# Patient Record
Sex: MALE | Race: WHITE | NOT HISPANIC OR LATINO | Employment: STUDENT | URBAN - METROPOLITAN AREA
[De-identification: names, ages, dates, MRNs, and addresses within clinical notes are randomized per-mention and may not be internally consistent; named-entity substitution may affect disease eponyms.]

---
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
## 2023-01-17 ENCOUNTER — PATIENT MESSAGE (OUTPATIENT)
Dept: PEDIATRICS | Facility: CLINIC | Age: 18
End: 2023-01-17
Payer: COMMERCIAL

## 2023-02-06 ENCOUNTER — PATIENT MESSAGE (OUTPATIENT)
Dept: ADMINISTRATIVE | Facility: HOSPITAL | Age: 18
End: 2023-02-06
Payer: COMMERCIAL

## 2023-05-01 ENCOUNTER — OFFICE VISIT (OUTPATIENT)
Dept: PEDIATRICS | Facility: CLINIC | Age: 18
End: 2023-05-01
Payer: COMMERCIAL

## 2023-05-01 VITALS
HEIGHT: 73 IN | DIASTOLIC BLOOD PRESSURE: 81 MMHG | SYSTOLIC BLOOD PRESSURE: 142 MMHG | BODY MASS INDEX: 29.49 KG/M2 | TEMPERATURE: 99 F | HEART RATE: 98 BPM | WEIGHT: 222.5 LBS

## 2023-05-01 DIAGNOSIS — F90.2 ADHD (ATTENTION DEFICIT HYPERACTIVITY DISORDER), COMBINED TYPE: Primary | ICD-10-CM

## 2023-05-01 DIAGNOSIS — Z00.00 ENCOUNTER FOR WELL ADULT EXAM WITHOUT ABNORMAL FINDINGS: ICD-10-CM

## 2023-05-01 PROCEDURE — 1160F RVW MEDS BY RX/DR IN RCRD: CPT | Mod: CPTII,S$GLB,, | Performed by: PEDIATRICS

## 2023-05-01 PROCEDURE — 1160F PR REVIEW ALL MEDS BY PRESCRIBER/CLIN PHARMACIST DOCUMENTED: ICD-10-PCS | Mod: CPTII,S$GLB,, | Performed by: PEDIATRICS

## 2023-05-01 PROCEDURE — 3077F SYST BP >= 140 MM HG: CPT | Mod: CPTII,S$GLB,, | Performed by: PEDIATRICS

## 2023-05-01 PROCEDURE — 99213 PR OFFICE/OUTPT VISIT, EST, LEVL III, 20-29 MIN: ICD-10-PCS | Mod: 25,S$GLB,, | Performed by: PEDIATRICS

## 2023-05-01 PROCEDURE — 99395 PR PREVENTIVE VISIT,EST,18-39: ICD-10-PCS | Mod: 25,S$GLB,, | Performed by: PEDIATRICS

## 2023-05-01 PROCEDURE — 99999 PR PBB SHADOW E&M-EST. PATIENT-LVL III: CPT | Mod: PBBFAC,,, | Performed by: PEDIATRICS

## 2023-05-01 PROCEDURE — 3079F PR MOST RECENT DIASTOLIC BLOOD PRESSURE 80-89 MM HG: ICD-10-PCS | Mod: CPTII,S$GLB,, | Performed by: PEDIATRICS

## 2023-05-01 PROCEDURE — 99999 PR PBB SHADOW E&M-EST. PATIENT-LVL III: ICD-10-PCS | Mod: PBBFAC,,, | Performed by: PEDIATRICS

## 2023-05-01 PROCEDURE — 3008F BODY MASS INDEX DOCD: CPT | Mod: CPTII,S$GLB,, | Performed by: PEDIATRICS

## 2023-05-01 PROCEDURE — 1159F MED LIST DOCD IN RCRD: CPT | Mod: CPTII,S$GLB,, | Performed by: PEDIATRICS

## 2023-05-01 PROCEDURE — 99395 PREV VISIT EST AGE 18-39: CPT | Mod: 25,S$GLB,, | Performed by: PEDIATRICS

## 2023-05-01 PROCEDURE — 3077F PR MOST RECENT SYSTOLIC BLOOD PRESSURE >= 140 MM HG: ICD-10-PCS | Mod: CPTII,S$GLB,, | Performed by: PEDIATRICS

## 2023-05-01 PROCEDURE — 1159F PR MEDICATION LIST DOCUMENTED IN MEDICAL RECORD: ICD-10-PCS | Mod: CPTII,S$GLB,, | Performed by: PEDIATRICS

## 2023-05-01 PROCEDURE — 3008F PR BODY MASS INDEX (BMI) DOCUMENTED: ICD-10-PCS | Mod: CPTII,S$GLB,, | Performed by: PEDIATRICS

## 2023-05-01 PROCEDURE — 99213 OFFICE O/P EST LOW 20 MIN: CPT | Mod: 25,S$GLB,, | Performed by: PEDIATRICS

## 2023-05-01 PROCEDURE — 3079F DIAST BP 80-89 MM HG: CPT | Mod: CPTII,S$GLB,, | Performed by: PEDIATRICS

## 2023-05-01 RX ORDER — DEXTROAMPHETAMINE SACCHARATE, AMPHETAMINE ASPARTATE MONOHYDRATE, DEXTROAMPHETAMINE SULFATE AND AMPHETAMINE SULFATE 5; 5; 5; 5 MG/1; MG/1; MG/1; MG/1
CAPSULE, EXTENDED RELEASE ORAL
Qty: 60 CAPSULE | Refills: 0 | Status: SHIPPED | OUTPATIENT
Start: 2023-06-01 | End: 2023-06-27 | Stop reason: SDUPTHER

## 2023-05-01 RX ORDER — DEXTROAMPHETAMINE SACCHARATE, AMPHETAMINE ASPARTATE MONOHYDRATE, DEXTROAMPHETAMINE SULFATE AND AMPHETAMINE SULFATE 5; 5; 5; 5 MG/1; MG/1; MG/1; MG/1
CAPSULE, EXTENDED RELEASE ORAL
Qty: 60 CAPSULE | Refills: 0 | Status: SHIPPED | OUTPATIENT
Start: 2023-05-01 | End: 2023-05-22 | Stop reason: SDUPTHER

## 2023-05-01 RX ORDER — DEXTROAMPHETAMINE SACCHARATE, AMPHETAMINE ASPARTATE MONOHYDRATE, DEXTROAMPHETAMINE SULFATE AND AMPHETAMINE SULFATE 2.5; 2.5; 2.5; 2.5 MG/1; MG/1; MG/1; MG/1
10 CAPSULE, EXTENDED RELEASE ORAL DAILY
Qty: 30 CAPSULE | Refills: 0 | Status: CANCELLED | OUTPATIENT
Start: 2023-05-01 | End: 2023-05-31

## 2023-05-01 RX ORDER — DEXTROAMPHETAMINE SACCHARATE, AMPHETAMINE ASPARTATE MONOHYDRATE, DEXTROAMPHETAMINE SULFATE AND AMPHETAMINE SULFATE 2.5; 2.5; 2.5; 2.5 MG/1; MG/1; MG/1; MG/1
10 CAPSULE, EXTENDED RELEASE ORAL DAILY
Qty: 30 CAPSULE | Refills: 0 | Status: CANCELLED | OUTPATIENT
Start: 2023-05-31 | End: 2023-06-30

## 2023-05-01 RX ORDER — DEXTROAMPHETAMINE SACCHARATE, AMPHETAMINE ASPARTATE MONOHYDRATE, DEXTROAMPHETAMINE SULFATE AND AMPHETAMINE SULFATE 7.5; 7.5; 7.5; 7.5 MG/1; MG/1; MG/1; MG/1
30 CAPSULE, EXTENDED RELEASE ORAL EVERY MORNING
Qty: 30 CAPSULE | Refills: 0 | Status: CANCELLED | OUTPATIENT
Start: 2023-05-31 | End: 2023-06-30

## 2023-05-01 RX ORDER — DEXTROAMPHETAMINE SACCHARATE, AMPHETAMINE ASPARTATE MONOHYDRATE, DEXTROAMPHETAMINE SULFATE AND AMPHETAMINE SULFATE 7.5; 7.5; 7.5; 7.5 MG/1; MG/1; MG/1; MG/1
30 CAPSULE, EXTENDED RELEASE ORAL EVERY MORNING
Qty: 30 CAPSULE | Refills: 0 | Status: CANCELLED | OUTPATIENT
Start: 2023-05-01 | End: 2023-05-31

## 2023-05-01 RX ORDER — DEXTROAMPHETAMINE SACCHARATE, AMPHETAMINE ASPARTATE MONOHYDRATE, DEXTROAMPHETAMINE SULFATE AND AMPHETAMINE SULFATE 7.5; 7.5; 7.5; 7.5 MG/1; MG/1; MG/1; MG/1
30 CAPSULE, EXTENDED RELEASE ORAL EVERY MORNING
Qty: 30 CAPSULE | Refills: 0 | Status: CANCELLED | OUTPATIENT
Start: 2023-06-30 | End: 2023-07-30

## 2023-05-01 RX ORDER — DEXTROAMPHETAMINE SACCHARATE, AMPHETAMINE ASPARTATE MONOHYDRATE, DEXTROAMPHETAMINE SULFATE AND AMPHETAMINE SULFATE 2.5; 2.5; 2.5; 2.5 MG/1; MG/1; MG/1; MG/1
10 CAPSULE, EXTENDED RELEASE ORAL DAILY
Qty: 30 CAPSULE | Refills: 0 | Status: CANCELLED | OUTPATIENT
Start: 2023-06-30 | End: 2023-07-30

## 2023-05-01 RX ORDER — DEXTROAMPHETAMINE SACCHARATE, AMPHETAMINE ASPARTATE MONOHYDRATE, DEXTROAMPHETAMINE SULFATE AND AMPHETAMINE SULFATE 5; 5; 5; 5 MG/1; MG/1; MG/1; MG/1
CAPSULE, EXTENDED RELEASE ORAL
Qty: 60 CAPSULE | Refills: 0 | Status: SHIPPED | OUTPATIENT
Start: 2023-07-01 | End: 2023-06-27 | Stop reason: RX

## 2023-05-01 NOTE — PATIENT INSTRUCTIONS
Dr. Santiago, [facility] [place]  Pediatric and Adolescent Medicine  [phone]        [facility]    Nutrition:  - Limit snacks, fast food, dessert, junk food.  Eat regular meals    Teeth:  FLOSS, brush minimum twice a day  Fluoride mouth wash, i. e. Act  - Dentist regularly  - You do want teeth after fifty years old?    Toxics:  - Alcohol, drugs, tobacco  Lots of availability, temptation    Driving:  over 5,000 college age die and >500,000 are injured each year from MVAs  Look three cars ahead  Do NOT text and drive  Wear your seat belt  Use your mirrors with constant surveillance      Dating:  - [place] are sexually driven, girls have a need to be loved  - Do you want to be a parent right now?  - Abstain  - Learn from all your relationships  - If break-up, speak nicely of him/her  - You will find the right partner at the right time.    Academics:    - [employer]  Prepare- read through material before class  Attend class  Review notes same day as class  Back-it-up:  Before test study at least three days before test  Professor- pick, if possible, after reviews by friends or <"[employer]> or <OM Latam>  Meet your professor outside of class, so they know your face    -Extra help- take advantage of study skills classes, academic centers or tutors.  Get ahead of any potential problems.  - Ideas- study partners or groups, but must study (not just social)      Exercise:  Don't be a couch potato  Work toward an hour each day of aerobic exercise (min 30 minutes)  Play tennis, golf, swim, walk/run, etc    Stay in touch:  - Contact your parents regularly during your schooling and beyond- mom & dad  - text is really easy    Our office:  - We are honored to continue being your doctor at least until you finish schooling.  - Once 18 years old, you have doctor/patient confidentiality.  Your parents cannot receive information from us unless you allow.    Immunizations:  - MCV4 (Meningococcal), need two,  one after 16 years old  - TdaP (Tetanus)- within last 10 years  - HPV- helps cervical cancer in girls; urogenital cancer in boys and girls

## 2023-05-01 NOTE — PROGRESS NOTES
Subjective  Max Gray is a 17 y.o. male who is here for a checkup alone, who is a historian.      Subjective:     HISTORY:    Interval History / Parental Concerns: getting ADHD medication    School:[employer]   Grade: Freshman majoring in General Education   Progress/Grades:  2.7 gpa    Working at [employer]- parttime- 30-40 hours.    Concerns:  none      Subjective:   HPI:    his  ADHD symptoms have worsened because of shortage of medication since last visit.    Attentive with homework: Yes    Side effects of Medicine:  Sleep, appetite or other? none    Current ADHD Medication/Dose: Adderall XR 30 and Adderall XR 10 mg  Concerns? Pt wants to switch medication Adderall XR 20 BID- unable to find Adderall XR 30 mg.    Depression Patient Health Questionnaire 5/1/2023   Over the last two weeks how often have you been bothered by little interest or pleasure in doing things Nearly every day   Over the last two weeks how often have you been bothered by feeling down, depressed or hopeless Not at all   PHQ-2 Total Score 3   Over the last two weeks how often have you been bothered by trouble falling or staying asleep, or sleeping too much Not at all   Over the last two weeks how often have you been bothered by feeling tired or having little energy Several days   Over the last two weeks how often have you been bothered by a poor appetite or overeating Not at all   Over the last two weeks how often have you been bothered by feeling bad about yourself - or that you are a failure or have let yourself or your family down Not at all   Over the last two weeks how often have you been bothered by trouble concentrating on things, such as reading the newspaper or watching television Nearly every day   Over the last two weeks how often have you been bothered by moving or speaking so slowly that other people could have noticed. Or the opposite - being so fidgety or restless that you have been moving around a lot more than usual.  "Several days   Over the last two weeks how often have you been bothered by thoughts that you would be better off dead, or of hurting yourself Not at all   If you checked off any problems, how difficult have these problems made it for you to do your work, take care of things at home or get along with other people? Somewhat difficult   Total Score 8   Interpretation Mild             Review of patient's allergies indicates:  No Known Allergies    Patient Active Problem List   Diagnosis    ADHD (attention deficit hyperactivity disorder), combined type           Objective:      PHYSICAL EXAM  Vitals:    05/01/23 1633   BP: (!) 142/81   BP Location: Right arm   Patient Position: Sitting   BP Method: Medium (Automatic)   Pulse: 98   Temp: 98.7 °F (37.1 °C)   TempSrc: Oral   Weight: 100.9 kg (222 lb 8 oz)   Height: 6' 0.5" (1.842 m)         Height Percentile for Age  85 %ile (Z= 1.04) based on [facility] (Boys, 2-20 Years) Stature-for-age data based on Stature recorded on 5/1/2023.    Weight Percentile for Age  97 %ile (Z= 1.93) based on CDC (Boys, 2-20 Years) weight-for-age data using vitals from 5/1/2023.    Body Mass Index  Body mass index is 29.76 kg/m².  94 %ile (Z= 1.59) based on CDC (Boys, 2-20 Years) BMI-for-age based on BMI available as of 5/1/2023.      Physical Exam  Vitals reviewed.   Constitutional:       Appearance: Normal appearance.   HENT:      Right Ear: Tympanic membrane normal.      Left Ear: Tympanic membrane normal.      Nose: Nose normal.      Mouth/Throat:      Pharynx: Oropharynx is clear.   Eyes:      Conjunctiva/sclera: Conjunctivae normal.   Cardiovascular:      Rate and Rhythm: Normal rate and regular rhythm.      Heart sounds: Normal heart sounds. No murmur heard.    No friction rub. No gallop.   Pulmonary:      Breath sounds: Normal breath sounds.   Abdominal:      Palpations: Abdomen is soft.      Tenderness: There is no abdominal tenderness.   Musculoskeletal:         General: Normal range of " motion.      Cervical back: Neck supple.   Skin:     Findings: No rash.   Neurological:      General: No focal deficit present.         Assessment/Plan:      ADHD (attention deficit hyperactivity disorder), combined type  -     dextroamphetamine-amphetamine (ADDERALL XR) 20 MG 24 hr capsule; Take 2 capsules by mouth every morning for 30 days  Dispense: 60 capsule; Refill: 0  -     dextroamphetamine-amphetamine (ADDERALL XR) 20 MG 24 hr capsule; Take two capsules by mouth daily for 30 days  Dispense: 60 capsule; Refill: 0  -     dextroamphetamine-amphetamine (ADDERALL XR) 20 MG 24 hr capsule; Take two capsules by mouth daily for 30 days  Dispense: 60 capsule; Refill: 0    Encounter for well adult exam without abnormal findings      Healthy     PLAN:  1.  Discussed anticipatory guidance (including nutrition, education, safety, dental care, discipline, family, exercise, physical acticvity) and given age appropriate hand out.   Pediatric Physical Activity and Nutritional Counseling  2.  Immunizations received.  May give Acetaminophen (Tylenol).  3.  Discussed after hours care and advice - call [phone] (our office).  4.  Follow-up at next well child visit (Regular Supervision Visit) in one year or sooner prn.      Instead of Adderall XR 30+10, will try same dose- Adderall XR 20 mg two daily

## 2023-05-05 ENCOUNTER — PATIENT MESSAGE (OUTPATIENT)
Dept: PEDIATRICS | Facility: CLINIC | Age: 18
End: 2023-05-05
Payer: COMMERCIAL

## 2023-05-22 DIAGNOSIS — F90.2 ADHD (ATTENTION DEFICIT HYPERACTIVITY DISORDER), COMBINED TYPE: ICD-10-CM

## 2023-05-22 RX ORDER — DEXTROAMPHETAMINE SACCHARATE, AMPHETAMINE ASPARTATE MONOHYDRATE, DEXTROAMPHETAMINE SULFATE AND AMPHETAMINE SULFATE 5; 5; 5; 5 MG/1; MG/1; MG/1; MG/1
CAPSULE, EXTENDED RELEASE ORAL
Qty: 60 CAPSULE | Refills: 0 | Status: SHIPPED | OUTPATIENT
Start: 2023-05-22 | End: 2023-06-26

## 2023-05-22 NOTE — TELEPHONE ENCOUNTER
----- Message from Birdie Atkins MA sent at 5/22/2023 11:55 AM CDT -----  Regarding: transfer prescription  Max wants his 20mg adderall prescription moved to the [facility] on [address].

## 2023-06-22 ENCOUNTER — PATIENT MESSAGE (OUTPATIENT)
Dept: PEDIATRICS | Facility: CLINIC | Age: 18
End: 2023-06-22
Payer: COMMERCIAL

## 2023-06-24 DIAGNOSIS — F90.2 ADHD (ATTENTION DEFICIT HYPERACTIVITY DISORDER), COMBINED TYPE: ICD-10-CM

## 2023-06-26 RX ORDER — DEXTROAMPHETAMINE SACCHARATE, AMPHETAMINE ASPARTATE MONOHYDRATE, DEXTROAMPHETAMINE SULFATE AND AMPHETAMINE SULFATE 5; 5; 5; 5 MG/1; MG/1; MG/1; MG/1
CAPSULE, EXTENDED RELEASE ORAL
Qty: 60 CAPSULE | Refills: 0 | Status: SHIPPED | OUTPATIENT
Start: 2023-06-26 | End: 2023-06-27 | Stop reason: RX

## 2023-06-27 DIAGNOSIS — F90.2 ADHD (ATTENTION DEFICIT HYPERACTIVITY DISORDER), COMBINED TYPE: ICD-10-CM

## 2023-06-27 RX ORDER — DEXTROAMPHETAMINE SACCHARATE, AMPHETAMINE ASPARTATE MONOHYDRATE, DEXTROAMPHETAMINE SULFATE AND AMPHETAMINE SULFATE 5; 5; 5; 5 MG/1; MG/1; MG/1; MG/1
CAPSULE, EXTENDED RELEASE ORAL
Qty: 60 CAPSULE | Refills: 0 | Status: SHIPPED | OUTPATIENT
Start: 2023-06-27 | End: 2023-07-26

## 2023-06-27 NOTE — TELEPHONE ENCOUNTER
"Spoke with pharmacist, they never received rx. Resending Adderall-XR 20 mg daily #30 to verified pharmacy. ---- Message from Shea Aldridge MA sent at 6/27/2023  2:50 PM CDT -----  Contact: Patient  Patient attempted to fill Adderall Rx but the pharmacy informed him that they were unable to fill the medication because it was "locked."     Call back - [phone]    "

## 2023-08-23 ENCOUNTER — OFFICE VISIT (OUTPATIENT)
Dept: PEDIATRICS | Facility: CLINIC | Age: 18
End: 2023-08-23
Payer: COMMERCIAL

## 2023-08-23 VITALS
TEMPERATURE: 99 F | BODY MASS INDEX: 27.83 KG/M2 | SYSTOLIC BLOOD PRESSURE: 129 MMHG | DIASTOLIC BLOOD PRESSURE: 65 MMHG | HEIGHT: 73 IN | WEIGHT: 210 LBS | HEART RATE: 81 BPM

## 2023-08-23 DIAGNOSIS — F90.2 ADHD (ATTENTION DEFICIT HYPERACTIVITY DISORDER), COMBINED TYPE: Primary | ICD-10-CM

## 2023-08-23 PROCEDURE — 99999 PR PBB SHADOW E&M-EST. PATIENT-LVL III: CPT | Mod: PBBFAC,,, | Performed by: PEDIATRICS

## 2023-08-23 PROCEDURE — 1159F PR MEDICATION LIST DOCUMENTED IN MEDICAL RECORD: ICD-10-PCS | Mod: CPTII,S$GLB,, | Performed by: PEDIATRICS

## 2023-08-23 PROCEDURE — 3008F PR BODY MASS INDEX (BMI) DOCUMENTED: ICD-10-PCS | Mod: CPTII,S$GLB,, | Performed by: PEDIATRICS

## 2023-08-23 PROCEDURE — 3008F BODY MASS INDEX DOCD: CPT | Mod: CPTII,S$GLB,, | Performed by: PEDIATRICS

## 2023-08-23 PROCEDURE — 3078F PR MOST RECENT DIASTOLIC BLOOD PRESSURE < 80 MM HG: ICD-10-PCS | Mod: CPTII,S$GLB,, | Performed by: PEDIATRICS

## 2023-08-23 PROCEDURE — 3078F DIAST BP <80 MM HG: CPT | Mod: CPTII,S$GLB,, | Performed by: PEDIATRICS

## 2023-08-23 PROCEDURE — 3074F PR MOST RECENT SYSTOLIC BLOOD PRESSURE < 130 MM HG: ICD-10-PCS | Mod: CPTII,S$GLB,, | Performed by: PEDIATRICS

## 2023-08-23 PROCEDURE — 99214 OFFICE O/P EST MOD 30 MIN: CPT | Mod: S$GLB,,, | Performed by: PEDIATRICS

## 2023-08-23 PROCEDURE — 1159F MED LIST DOCD IN RCRD: CPT | Mod: CPTII,S$GLB,, | Performed by: PEDIATRICS

## 2023-08-23 PROCEDURE — 3074F SYST BP LT 130 MM HG: CPT | Mod: CPTII,S$GLB,, | Performed by: PEDIATRICS

## 2023-08-23 PROCEDURE — 99214 PR OFFICE/OUTPT VISIT, EST, LEVL IV, 30-39 MIN: ICD-10-PCS | Mod: S$GLB,,, | Performed by: PEDIATRICS

## 2023-08-23 PROCEDURE — 99999 PR PBB SHADOW E&M-EST. PATIENT-LVL III: ICD-10-PCS | Mod: PBBFAC,,, | Performed by: PEDIATRICS

## 2023-08-23 RX ORDER — DEXTROAMPHETAMINE SACCHARATE, AMPHETAMINE ASPARTATE MONOHYDRATE, DEXTROAMPHETAMINE SULFATE AND AMPHETAMINE SULFATE 5; 5; 5; 5 MG/1; MG/1; MG/1; MG/1
20 CAPSULE, EXTENDED RELEASE ORAL EVERY MORNING
Qty: 30 CAPSULE | Refills: 0 | Status: CANCELLED | OUTPATIENT
Start: 2023-10-22 | End: 2023-11-21

## 2023-08-23 RX ORDER — DEXTROAMPHETAMINE SACCHARATE, AMPHETAMINE ASPARTATE MONOHYDRATE, DEXTROAMPHETAMINE SULFATE AND AMPHETAMINE SULFATE 5; 5; 5; 5 MG/1; MG/1; MG/1; MG/1
40 CAPSULE, EXTENDED RELEASE ORAL EVERY MORNING
Qty: 60 CAPSULE | Refills: 0 | Status: SHIPPED | OUTPATIENT
Start: 2023-08-23 | End: 2023-10-24 | Stop reason: SDUPTHER

## 2023-08-23 RX ORDER — DEXTROAMPHETAMINE SACCHARATE, AMPHETAMINE ASPARTATE MONOHYDRATE, DEXTROAMPHETAMINE SULFATE AND AMPHETAMINE SULFATE 5; 5; 5; 5 MG/1; MG/1; MG/1; MG/1
40 CAPSULE, EXTENDED RELEASE ORAL EVERY MORNING
Qty: 60 CAPSULE | Refills: 0 | Status: SHIPPED | OUTPATIENT
Start: 2023-08-23 | End: 2023-09-22

## 2023-08-23 RX ORDER — DEXTROAMPHETAMINE SACCHARATE, AMPHETAMINE ASPARTATE MONOHYDRATE, DEXTROAMPHETAMINE SULFATE AND AMPHETAMINE SULFATE 5; 5; 5; 5 MG/1; MG/1; MG/1; MG/1
20 CAPSULE, EXTENDED RELEASE ORAL EVERY MORNING
Qty: 30 CAPSULE | Refills: 0 | Status: CANCELLED | OUTPATIENT
Start: 2023-08-23 | End: 2023-09-22

## 2023-08-23 RX ORDER — DEXTROAMPHETAMINE SACCHARATE, AMPHETAMINE ASPARTATE MONOHYDRATE, DEXTROAMPHETAMINE SULFATE AND AMPHETAMINE SULFATE 5; 5; 5; 5 MG/1; MG/1; MG/1; MG/1
20 CAPSULE, EXTENDED RELEASE ORAL EVERY MORNING
Qty: 30 CAPSULE | Refills: 0 | Status: CANCELLED | OUTPATIENT
Start: 2023-09-22 | End: 2023-10-22

## 2023-08-23 NOTE — PROGRESS NOTES
"    Subjective:   HPI:  Max Gray is a 17 y.o. patient here for follow up ADHD visit,  alone, who is a historian    his  ADHD symptoms have improved since last visit.    Attentive in afternoon (with homework): yes    Side effects of Medicine:    Trouble Sleeping; Appetite Changes; Jitteriness; Irritability or moodiness; Headaches or Stomach Aches; Other? Change in sleep cycle    Level/Grade in School:Working at [employer]   Performance: Going well    Accommodations? No    Current ADHD Medication/Dose in am: Adderall XR 20 mg BID   Afternoon medicine?   Dose-    Taking daily? Yes    Concerns? no        Daria allergies, medications, history, and problem list were updated as appropriate.    Review of patient's allergies indicates:  No Known Allergies    Patient Active Problem List   Diagnosis    ADHD (attention deficit hyperactivity disorder), combined type         Review of Systems  A comprehensive review of symptoms was completed and negative except as noted in the HPI.      Objective:     Vitals:    08/23/23 1110   BP: 129/65   BP Location: Left arm   Patient Position: Sitting   BP Method: Large (Automatic)   Pulse: 81   Temp: 98.5 °F (36.9 °C)   TempSrc: Oral   Weight: 95.3 kg (210 lb)   Height: 6' 0.75" (1.848 m)       Last 3 Weights:   Wt Readings from Last 3 Encounters:   08/23/23 1110 95.3 kg (210 lb) (95 %, Z= 1.64)*   05/01/23 1633 100.9 kg (222 lb 8 oz) (97 %, Z= 1.93)*   11/28/22 1528 90.9 kg (200 lb 6 oz) (93 %, Z= 1.48)*     * Growth percentiles are based on CDC (Boys, 2-20 Years) data.         Physical Exam  Vitals reviewed.   Constitutional:       Appearance: Normal appearance.   HENT:      Right Ear: Tympanic membrane normal.      Left Ear: Tympanic membrane normal.      Nose: Nose normal.      Mouth/Throat:      Pharynx: Oropharynx is clear.   Eyes:      Conjunctiva/sclera: Conjunctivae normal.   Cardiovascular:      Rate and Rhythm: Normal rate and regular rhythm.      Heart sounds: Normal " heart sounds. No murmur heard.     No friction rub. No gallop.   Pulmonary:      Breath sounds: Normal breath sounds.   Abdominal:      Palpations: Abdomen is soft.      Tenderness: There is no abdominal tenderness.   Musculoskeletal:         General: Normal range of motion.      Cervical back: Neck supple.   Skin:     Findings: No rash.   Neurological:      General: No focal deficit present.           Assessment/Plan:        ADHD (attention deficit hyperactivity disorder), combined type  -     dextroamphetamine-amphetamine (ADDERALL XR) 20 MG 24 hr capsule; Take 2 capsules (40 mg total) by mouth every morning.  Dispense: 60 capsule; Refill: 0  -     dextroamphetamine-amphetamine (ADDERALL XR) 20 MG 24 hr capsule; Take 2 capsules (40 mg total) by mouth every morning.  Dispense: 60 capsule; Refill: 0  -     dextroamphetamine-amphetamine (ADDERALL XR) 20 MG 24 hr capsule; Take 2 capsules (40 mg total) by mouth every morning.  Dispense: 60 capsule; Refill: 0            Outpatient Encounter Medications as of 8/23/2023   Medication Sig Dispense Refill    dextroamphetamine-amphetamine (ADDERALL XR) 20 MG 24 hr capsule Take 20 mg by mouth.      dextroamphetamine-amphetamine (ADDERALL XR) 10 MG 24 hr capsule Take 1 capsule (10 mg total) by mouth every morning. With Adderall XR 30mg 30 capsule 0    dextroamphetamine-amphetamine (ADDERALL XR) 20 MG 24 hr capsule Take 2 capsules (40 mg total) by mouth every morning. 60 capsule 0    dextroamphetamine-amphetamine (ADDERALL XR) 20 MG 24 hr capsule Take 2 capsules (40 mg total) by mouth every morning. 60 capsule 0    dextroamphetamine-amphetamine (ADDERALL XR) 20 MG 24 hr capsule Take 2 capsules (40 mg total) by mouth every morning. 60 capsule 0    dextroamphetamine-amphetamine (ADDERALL XR) 30 MG 24 hr capsule Take 1 capsule (30 mg total) by mouth every morning. With Adderall XR 10mg 30 capsule 0     No facility-administered encounter medications on file as of 8/23/2023.          Next scheduled follow-up appointment for ADD/ADHD management will be in 3 months.  If changes are made to medications, then will need to follow up in three to four weeks.    We discussed the management goals for patients with ADHD:  1. Adequate Control of Focus and/or Behavior.  2. Tolerable Medication Side-Effects (Including some Loss of Appetite).  Need to maintain weight.  3. Function well in life, school and/or work.

## 2023-10-24 DIAGNOSIS — F90.2 ADHD (ATTENTION DEFICIT HYPERACTIVITY DISORDER), COMBINED TYPE: ICD-10-CM

## 2023-10-24 RX ORDER — DEXTROAMPHETAMINE SACCHARATE, AMPHETAMINE ASPARTATE MONOHYDRATE, DEXTROAMPHETAMINE SULFATE AND AMPHETAMINE SULFATE 5; 5; 5; 5 MG/1; MG/1; MG/1; MG/1
40 CAPSULE, EXTENDED RELEASE ORAL EVERY MORNING
Qty: 60 CAPSULE | Refills: 0 | Status: SHIPPED | OUTPATIENT
Start: 2023-10-24 | End: 2023-12-23

## 2023-11-27 DIAGNOSIS — F90.2 ADHD (ATTENTION DEFICIT HYPERACTIVITY DISORDER), COMBINED TYPE: ICD-10-CM

## 2023-11-27 RX ORDER — DEXTROAMPHETAMINE SACCHARATE, AMPHETAMINE ASPARTATE MONOHYDRATE, DEXTROAMPHETAMINE SULFATE AND AMPHETAMINE SULFATE 5; 5; 5; 5 MG/1; MG/1; MG/1; MG/1
40 CAPSULE, EXTENDED RELEASE ORAL EVERY MORNING
Qty: 60 CAPSULE | Refills: 0 | OUTPATIENT
Start: 2023-11-27 | End: 2024-01-26

## 2023-12-05 ENCOUNTER — OFFICE VISIT (OUTPATIENT)
Dept: PEDIATRICS | Facility: CLINIC | Age: 18
End: 2023-12-05
Payer: COMMERCIAL

## 2023-12-05 VITALS
BODY MASS INDEX: 27.83 KG/M2 | HEART RATE: 82 BPM | SYSTOLIC BLOOD PRESSURE: 129 MMHG | WEIGHT: 210 LBS | HEIGHT: 73 IN | TEMPERATURE: 97 F | DIASTOLIC BLOOD PRESSURE: 84 MMHG

## 2023-12-05 DIAGNOSIS — F90.2 ADHD (ATTENTION DEFICIT HYPERACTIVITY DISORDER), COMBINED TYPE: Primary | ICD-10-CM

## 2023-12-05 PROCEDURE — 90686 IIV4 VACC NO PRSV 0.5 ML IM: CPT | Mod: S$GLB,,, | Performed by: PEDIATRICS

## 2023-12-05 PROCEDURE — 90686 FLU VACCINE (QUAD) GREATER THAN OR EQUAL TO 3YO PRESERVATIVE FREE IM: ICD-10-PCS | Mod: S$GLB,,, | Performed by: PEDIATRICS

## 2023-12-05 PROCEDURE — 90471 IMMUNIZATION ADMIN: CPT | Mod: S$GLB,,, | Performed by: PEDIATRICS

## 2023-12-05 PROCEDURE — 1160F RVW MEDS BY RX/DR IN RCRD: CPT | Mod: CPTII,S$GLB,, | Performed by: PEDIATRICS

## 2023-12-05 PROCEDURE — 1159F MED LIST DOCD IN RCRD: CPT | Mod: CPTII,S$GLB,, | Performed by: PEDIATRICS

## 2023-12-05 PROCEDURE — 1160F PR REVIEW ALL MEDS BY PRESCRIBER/CLIN PHARMACIST DOCUMENTED: ICD-10-PCS | Mod: CPTII,S$GLB,, | Performed by: PEDIATRICS

## 2023-12-05 PROCEDURE — 3008F BODY MASS INDEX DOCD: CPT | Mod: CPTII,S$GLB,, | Performed by: PEDIATRICS

## 2023-12-05 PROCEDURE — 3008F PR BODY MASS INDEX (BMI) DOCUMENTED: ICD-10-PCS | Mod: CPTII,S$GLB,, | Performed by: PEDIATRICS

## 2023-12-05 PROCEDURE — 99213 OFFICE O/P EST LOW 20 MIN: CPT | Mod: 25,S$GLB,, | Performed by: PEDIATRICS

## 2023-12-05 PROCEDURE — 3079F PR MOST RECENT DIASTOLIC BLOOD PRESSURE 80-89 MM HG: ICD-10-PCS | Mod: CPTII,S$GLB,, | Performed by: PEDIATRICS

## 2023-12-05 PROCEDURE — 3074F SYST BP LT 130 MM HG: CPT | Mod: CPTII,S$GLB,, | Performed by: PEDIATRICS

## 2023-12-05 PROCEDURE — 90471 FLU VACCINE (QUAD) GREATER THAN OR EQUAL TO 3YO PRESERVATIVE FREE IM: ICD-10-PCS | Mod: S$GLB,,, | Performed by: PEDIATRICS

## 2023-12-05 PROCEDURE — 99999 PR PBB SHADOW E&M-EST. PATIENT-LVL III: ICD-10-PCS | Mod: PBBFAC,,, | Performed by: PEDIATRICS

## 2023-12-05 PROCEDURE — 1159F PR MEDICATION LIST DOCUMENTED IN MEDICAL RECORD: ICD-10-PCS | Mod: CPTII,S$GLB,, | Performed by: PEDIATRICS

## 2023-12-05 PROCEDURE — 3079F DIAST BP 80-89 MM HG: CPT | Mod: CPTII,S$GLB,, | Performed by: PEDIATRICS

## 2023-12-05 PROCEDURE — 99213 PR OFFICE/OUTPT VISIT, EST, LEVL III, 20-29 MIN: ICD-10-PCS | Mod: 25,S$GLB,, | Performed by: PEDIATRICS

## 2023-12-05 PROCEDURE — 3074F PR MOST RECENT SYSTOLIC BLOOD PRESSURE < 130 MM HG: ICD-10-PCS | Mod: CPTII,S$GLB,, | Performed by: PEDIATRICS

## 2023-12-05 PROCEDURE — 99999 PR PBB SHADOW E&M-EST. PATIENT-LVL III: CPT | Mod: PBBFAC,,, | Performed by: PEDIATRICS

## 2023-12-05 RX ORDER — DEXTROAMPHETAMINE SACCHARATE, AMPHETAMINE ASPARTATE MONOHYDRATE, DEXTROAMPHETAMINE SULFATE AND AMPHETAMINE SULFATE 5; 5; 5; 5 MG/1; MG/1; MG/1; MG/1
40 CAPSULE, EXTENDED RELEASE ORAL EVERY MORNING
Qty: 60 CAPSULE | Refills: 0 | Status: SHIPPED | OUTPATIENT
Start: 2024-02-03 | End: 2024-03-04

## 2023-12-05 RX ORDER — DEXTROAMPHETAMINE SACCHARATE, AMPHETAMINE ASPARTATE MONOHYDRATE, DEXTROAMPHETAMINE SULFATE AND AMPHETAMINE SULFATE 5; 5; 5; 5 MG/1; MG/1; MG/1; MG/1
40 CAPSULE, EXTENDED RELEASE ORAL EVERY MORNING
Qty: 60 CAPSULE | Refills: 0 | Status: SHIPPED | OUTPATIENT
Start: 2023-12-05 | End: 2023-12-26 | Stop reason: SDUPTHER

## 2023-12-05 RX ORDER — DEXTROAMPHETAMINE SACCHARATE, AMPHETAMINE ASPARTATE MONOHYDRATE, DEXTROAMPHETAMINE SULFATE AND AMPHETAMINE SULFATE 5; 5; 5; 5 MG/1; MG/1; MG/1; MG/1
40 CAPSULE, EXTENDED RELEASE ORAL EVERY MORNING
Qty: 60 CAPSULE | Refills: 0 | Status: SHIPPED | OUTPATIENT
Start: 2024-01-04 | End: 2024-01-27 | Stop reason: SDUPTHER

## 2023-12-05 NOTE — PROGRESS NOTES
"    Subjective:   HPI:  Max Gray is a 18 y.o. patient here for follow up ADHD visit,  alone, who is a historian    his  ADHD symptoms have remained the same since last visit.    Attentive in afternoon (with homework): yes    Side effects of Medicine:    Trouble Sleeping; Appetite Changes; Jitteriness; Irritability or moodiness; Headaches or Stomach Aches; Other? none    Level/Grade in School: Working at [employer]- interview for upper position soon  Performance: going well    Accommodations? No    Current ADHD Medication/Dose in am: Adderall XR 20 mg x 2 ( 40 mg)   Afternoon medicine?   Dose- none   Taking daily? Yes    Concerns? no        Daria allergies, medications, history, and problem list were updated as appropriate.    Review of patient's allergies indicates:  No Known Allergies    Patient Active Problem List   Diagnosis    ADHD (attention deficit hyperactivity disorder), combined type         Review of Systems  A comprehensive review of symptoms was completed and negative except as noted in the HPI.      Objective:     Vitals:    12/05/23 1054   BP: 129/84   BP Location: Left arm   Patient Position: Sitting   BP Method: Medium (Automatic)   Pulse: 82   Temp: 97.3 °F (36.3 °C)   TempSrc: Oral   Weight: 95.3 kg (210 lb)   Height: 6' 1" (1.854 m)       Last 3 Weights:   Wt Readings from Last 3 Encounters:   12/05/23 1054 95.3 kg (210 lb)   08/23/23 1110 95.3 kg (210 lb) (95 %, Z= 1.64)*   05/01/23 1633 100.9 kg (222 lb 8 oz) (97 %, Z= 1.93)*     * Growth percentiles are based on CDC (Boys, 2-20 Years) data.         Physical Exam  Vitals reviewed.   Constitutional:       Appearance: Normal appearance.   HENT:      Right Ear: Tympanic membrane normal.      Left Ear: Tympanic membrane normal.      Nose: Nose normal.      Mouth/Throat:      Pharynx: Oropharynx is clear.   Eyes:      Conjunctiva/sclera: Conjunctivae normal.   Cardiovascular:      Rate and Rhythm: Normal rate and regular rhythm.      Heart " sounds: Normal heart sounds. No murmur heard.     No friction rub. No gallop.   Pulmonary:      Breath sounds: Normal breath sounds.   Abdominal:      Palpations: Abdomen is soft.      Tenderness: There is no abdominal tenderness.   Musculoskeletal:         General: Normal range of motion.      Cervical back: Neck supple.   Skin:     Findings: No rash.   Neurological:      General: No focal deficit present.           Assessment/Plan:        ADHD (attention deficit hyperactivity disorder), combined type  -     dextroamphetamine-amphetamine (ADDERALL XR) 20 MG 24 hr capsule; Take 2 capsules (40 mg total) by mouth every morning.  Dispense: 60 capsule; Refill: 0  -     dextroamphetamine-amphetamine (ADDERALL XR) 20 MG 24 hr capsule; Take 2 capsules (40 mg total) by mouth every morning.  Dispense: 60 capsule; Refill: 0  -     dextroamphetamine-amphetamine (ADDERALL XR) 20 MG 24 hr capsule; Take 2 capsules (40 mg total) by mouth every morning.  Dispense: 60 capsule; Refill: 0    Other orders  -     Influenza - Quadrivalent (PF)            Outpatient Encounter Medications as of 12/5/2023   Medication Sig Dispense Refill    dextroamphetamine-amphetamine (ADDERALL XR) 20 MG 24 hr capsule Take 20 mg by mouth.      dextroamphetamine-amphetamine (ADDERALL XR) 20 MG 24 hr capsule Take 2 capsules (40 mg total) by mouth every morning. (Patient not taking: Reported on 12/5/2023) 60 capsule 0    dextroamphetamine-amphetamine (ADDERALL XR) 20 MG 24 hr capsule Take 2 capsules (40 mg total) by mouth every morning. 60 capsule 0    [START ON 1/4/2024] dextroamphetamine-amphetamine (ADDERALL XR) 20 MG 24 hr capsule Take 2 capsules (40 mg total) by mouth every morning. 60 capsule 0    [START ON 2/3/2024] dextroamphetamine-amphetamine (ADDERALL XR) 20 MG 24 hr capsule Take 2 capsules (40 mg total) by mouth every morning. 60 capsule 0    [DISCONTINUED] dextroamphetamine-amphetamine (ADDERALL XR) 10 MG 24 hr capsule Take 1 capsule (10 mg  total) by mouth every morning. With Adderall XR 30mg 30 capsule 0    [DISCONTINUED] dextroamphetamine-amphetamine (ADDERALL XR) 30 MG 24 hr capsule Take 1 capsule (30 mg total) by mouth every morning. With Adderall XR 10mg 30 capsule 0     No facility-administered encounter medications on file as of 12/5/2023.         Next scheduled follow-up appointment for ADD/ADHD management will be in 3 months.  If changes are made to medications, then will need to follow up in three to four weeks.    We discussed the management goals for patients with ADHD:  1. Adequate Control of Focus and/or Behavior.  2. Tolerable Medication Side-Effects (Including some Loss of Appetite).  Need to maintain weight.  3. Function well in life, school and/or work.

## 2023-12-26 DIAGNOSIS — F90.2 ADHD (ATTENTION DEFICIT HYPERACTIVITY DISORDER), COMBINED TYPE: ICD-10-CM

## 2023-12-26 RX ORDER — DEXTROAMPHETAMINE SACCHARATE, AMPHETAMINE ASPARTATE MONOHYDRATE, DEXTROAMPHETAMINE SULFATE AND AMPHETAMINE SULFATE 5; 5; 5; 5 MG/1; MG/1; MG/1; MG/1
40 CAPSULE, EXTENDED RELEASE ORAL EVERY MORNING
Qty: 60 CAPSULE | Refills: 0 | Status: SHIPPED | OUTPATIENT
Start: 2023-12-26 | End: 2024-01-25

## 2023-12-26 NOTE — TELEPHONE ENCOUNTER
Pharmacy change: Adderall Xr rx attached Last adhd visit 12/20/23----- Message from Paige Liu MA sent at 12/26/2023 10:19 AM CST -----  Regarding: pharmacy change  Contact: pt  Pt is requesting his adderall rx be called into the [facility] on [address] please.

## 2024-01-27 DIAGNOSIS — F90.2 ADHD (ATTENTION DEFICIT HYPERACTIVITY DISORDER), COMBINED TYPE: ICD-10-CM

## 2024-01-29 RX ORDER — DEXTROAMPHETAMINE SACCHARATE, AMPHETAMINE ASPARTATE MONOHYDRATE, DEXTROAMPHETAMINE SULFATE AND AMPHETAMINE SULFATE 5; 5; 5; 5 MG/1; MG/1; MG/1; MG/1
40 CAPSULE, EXTENDED RELEASE ORAL EVERY MORNING
Qty: 60 CAPSULE | Refills: 0 | Status: SHIPPED | OUTPATIENT
Start: 2024-01-29 | End: 2024-02-28

## 2024-02-28 DIAGNOSIS — F90.2 ADHD (ATTENTION DEFICIT HYPERACTIVITY DISORDER), COMBINED TYPE: ICD-10-CM

## 2024-02-29 ENCOUNTER — PATIENT MESSAGE (OUTPATIENT)
Dept: PEDIATRICS | Facility: CLINIC | Age: 19
End: 2024-02-29
Payer: COMMERCIAL

## 2024-02-29 RX ORDER — DEXTROAMPHETAMINE SACCHARATE, AMPHETAMINE ASPARTATE MONOHYDRATE, DEXTROAMPHETAMINE SULFATE AND AMPHETAMINE SULFATE 5; 5; 5; 5 MG/1; MG/1; MG/1; MG/1
40 CAPSULE, EXTENDED RELEASE ORAL EVERY MORNING
Qty: 60 CAPSULE | Refills: 0 | OUTPATIENT
Start: 2024-02-29 | End: 2024-03-30

## 2024-03-06 ENCOUNTER — OFFICE VISIT (OUTPATIENT)
Dept: PEDIATRICS | Facility: CLINIC | Age: 19
End: 2024-03-06
Payer: COMMERCIAL

## 2024-03-06 VITALS
SYSTOLIC BLOOD PRESSURE: 129 MMHG | WEIGHT: 198.19 LBS | HEIGHT: 73 IN | TEMPERATURE: 99 F | BODY MASS INDEX: 26.27 KG/M2 | DIASTOLIC BLOOD PRESSURE: 83 MMHG | HEART RATE: 85 BPM

## 2024-03-06 DIAGNOSIS — F90.2 ADHD (ATTENTION DEFICIT HYPERACTIVITY DISORDER), COMBINED TYPE: ICD-10-CM

## 2024-03-06 DIAGNOSIS — M54.9 BACK PAIN, UNSPECIFIED BACK LOCATION, UNSPECIFIED BACK PAIN LATERALITY, UNSPECIFIED CHRONICITY: ICD-10-CM

## 2024-03-06 DIAGNOSIS — F90.2 ADHD (ATTENTION DEFICIT HYPERACTIVITY DISORDER), COMBINED TYPE: Primary | ICD-10-CM

## 2024-03-06 PROCEDURE — 1159F MED LIST DOCD IN RCRD: CPT | Mod: CPTII,S$GLB,, | Performed by: PEDIATRICS

## 2024-03-06 PROCEDURE — 99999 PR PBB SHADOW E&M-EST. PATIENT-LVL III: CPT | Mod: PBBFAC,,, | Performed by: PEDIATRICS

## 2024-03-06 PROCEDURE — 3079F DIAST BP 80-89 MM HG: CPT | Mod: CPTII,S$GLB,, | Performed by: PEDIATRICS

## 2024-03-06 PROCEDURE — 3008F BODY MASS INDEX DOCD: CPT | Mod: CPTII,S$GLB,, | Performed by: PEDIATRICS

## 2024-03-06 PROCEDURE — 1160F RVW MEDS BY RX/DR IN RCRD: CPT | Mod: CPTII,S$GLB,, | Performed by: PEDIATRICS

## 2024-03-06 PROCEDURE — 3074F SYST BP LT 130 MM HG: CPT | Mod: CPTII,S$GLB,, | Performed by: PEDIATRICS

## 2024-03-06 PROCEDURE — 99214 OFFICE O/P EST MOD 30 MIN: CPT | Mod: S$GLB,,, | Performed by: PEDIATRICS

## 2024-03-06 RX ORDER — DEXTROAMPHETAMINE SACCHARATE, AMPHETAMINE ASPARTATE MONOHYDRATE, DEXTROAMPHETAMINE SULFATE AND AMPHETAMINE SULFATE 5; 5; 5; 5 MG/1; MG/1; MG/1; MG/1
20 CAPSULE, EXTENDED RELEASE ORAL EVERY MORNING
Qty: 30 CAPSULE | Refills: 0 | Status: SHIPPED | OUTPATIENT
Start: 2024-05-05 | End: 2024-03-06

## 2024-03-06 RX ORDER — NAPROXEN 500 MG/1
500 TABLET ORAL 2 TIMES DAILY WITH MEALS
Qty: 60 TABLET | Refills: 1 | Status: SHIPPED | OUTPATIENT
Start: 2024-03-06

## 2024-03-06 RX ORDER — DEXTROAMPHETAMINE SACCHARATE, AMPHETAMINE ASPARTATE MONOHYDRATE, DEXTROAMPHETAMINE SULFATE AND AMPHETAMINE SULFATE 5; 5; 5; 5 MG/1; MG/1; MG/1; MG/1
20 CAPSULE, EXTENDED RELEASE ORAL EVERY MORNING
Qty: 30 CAPSULE | Refills: 0 | Status: SHIPPED | OUTPATIENT
Start: 2024-04-05 | End: 2024-03-06

## 2024-03-06 RX ORDER — DEXTROAMPHETAMINE SACCHARATE, AMPHETAMINE ASPARTATE MONOHYDRATE, DEXTROAMPHETAMINE SULFATE AND AMPHETAMINE SULFATE 5; 5; 5; 5 MG/1; MG/1; MG/1; MG/1
20 CAPSULE, EXTENDED RELEASE ORAL EVERY MORNING
Qty: 30 CAPSULE | Refills: 0 | Status: SHIPPED | OUTPATIENT
Start: 2024-03-06 | End: 2024-03-06 | Stop reason: SDUPTHER

## 2024-03-06 NOTE — PATIENT INSTRUCTIONS
[facility]   [facility]  [phone]          Musculoskeletal Pain Treatment:  1. At the beginning, rest, avoiding activities that make the pain worse  2.  Ice after use, heating pad at night  3.  Avoid sudden movements that may stress the inflammation  4.  Myoflex cream or Aspercreme  5.  Pain relief with oral medication    Naprosyn twice a day    +/-  Acetaminophen (Tylenol)

## 2024-03-06 NOTE — ADDENDUM NOTE
Addended by: MARIE EATON II on: 3/6/2024 09:30 AM     Modules accepted: Orders, Level of Service

## 2024-03-06 NOTE — PROGRESS NOTES
"    Subjective:   HPI:  Max Gray is a 18 y.o. patient here for follow up ADHD visit,  alone, who is a historian    his  ADHD symptoms have remained the same since last visit.    Attentive in afternoon (with homework): yes    Side effects of Medicine:    Trouble Sleeping; Appetite Changes; Jitteriness; Irritability or moodiness; Headaches or Stomach Aches; Other? none    Level/Grade in School:Working at [employer] in [employer]  Performance: going well    Accommodations? No    Current ADHD Medication/Dose in am: Adderall XR 20 mg   Afternoon medicine?   Dose- none   Taking daily? Yes    Concerns? no    Mid- upper back pain.  Works at [employer].  Hunched shoulders.      Last RHS:  05/01/23    Daria allergies, medications, history, and problem list were updated as appropriate.    Review of patient's allergies indicates:  No Known Allergies    Patient Active Problem List   Diagnosis    ADHD (attention deficit hyperactivity disorder), combined type    Back pain         Review of Systems  A comprehensive review of symptoms was completed and negative except as noted in the HPI.      Objective:     Vitals:    03/06/24 0917   BP: 129/83   BP Location: Right arm   Patient Position: Sitting   BP Method: Medium (Automatic)   Pulse: 85   Temp: 98.5 °F (36.9 °C)   TempSrc: Oral   Weight: 89.9 kg (198 lb 3.2 oz)   Height: 6' 1.25" (1.861 m)           Physical Exam  Vitals reviewed.   Constitutional:       Appearance: Normal appearance.   HENT:      Right Ear: Tympanic membrane normal.      Left Ear: Tympanic membrane normal.      Nose: Nose normal.      Mouth/Throat:      Pharynx: Oropharynx is clear.   Eyes:      Conjunctiva/sclera: Conjunctivae normal.   Cardiovascular:      Rate and Rhythm: Normal rate and regular rhythm.      Heart sounds: Normal heart sounds. No murmur heard.     No friction rub. No gallop.   Pulmonary:      Breath sounds: Normal breath sounds.   Abdominal:      Palpations: Abdomen is soft.      " Tenderness: There is no abdominal tenderness.   Musculoskeletal:         General: Normal range of motion.      Cervical back: Neck supple.   Skin:     Findings: No rash.   Neurological:      General: No focal deficit present.           Assessment/Plan:        ADHD (attention deficit hyperactivity disorder), combined type  -     dextroamphetamine-amphetamine (ADDERALL XR) 20 MG 24 hr capsule; Take 1 capsule (20 mg total) by mouth every morning.  Dispense: 30 capsule; Refill: 0  -     dextroamphetamine-amphetamine (ADDERALL XR) 20 MG 24 hr capsule; Take 1 capsule (20 mg total) by mouth every morning.  Dispense: 30 capsule; Refill: 0  -     dextroamphetamine-amphetamine (ADDERALL XR) 20 MG 24 hr capsule; Take 1 capsule (20 mg total) by mouth every morning.  Dispense: 30 capsule; Refill: 0    Back pain, unspecified back location, unspecified back pain laterality, unspecified chronicity  -     naproxen (NAPROSYN) 500 MG tablet; Take 1 tablet (500 mg total) by mouth 2 (two) times daily with meals.  Dispense: 60 tablet; Refill: 1    HO- Musculoskeletal pain    Handout provided  Follow instructions listed on hand out for treatment  Call or return to clinic if worsens or does not resolve            Outpatient Encounter Medications as of 3/6/2024   Medication Sig Dispense Refill    dextroamphetamine-amphetamine (ADDERALL XR) 20 MG 24 hr capsule Take 20 mg by mouth.      dextroamphetamine-amphetamine (ADDERALL XR) 20 MG 24 hr capsule Take 2 capsules (40 mg total) by mouth every morning. 60 capsule 0    dextroamphetamine-amphetamine (ADDERALL XR) 20 MG 24 hr capsule Take 1 capsule (20 mg total) by mouth every morning. 30 capsule 0    [START ON 4/5/2024] dextroamphetamine-amphetamine (ADDERALL XR) 20 MG 24 hr capsule Take 1 capsule (20 mg total) by mouth every morning. 30 capsule 0    [START ON 5/5/2024] dextroamphetamine-amphetamine (ADDERALL XR) 20 MG 24 hr capsule Take 1 capsule (20 mg total) by mouth every morning. 30  capsule 0    naproxen (NAPROSYN) 500 MG tablet Take 1 tablet (500 mg total) by mouth 2 (two) times daily with meals. 60 tablet 1     No facility-administered encounter medications on file as of 3/6/2024.         Next scheduled follow-up appointment for ADD/ADHD management will be in 3 months.  If changes are made to medications, then will need to follow up in three to four weeks.    We discussed the management goals for patients with ADHD:  1. Adequate Control of Focus and/or Behavior.  2. Tolerable Medication Side-Effects (Including some Loss of Appetite).  Need to maintain weight.  3. Function well in life, school and/or work.

## 2024-03-06 NOTE — TELEPHONE ENCOUNTER
Picked up prescription and noticed when he got home that it was only #30. All 3 prescriptions done for wrong quantity. He takes 2 po every am.

## 2024-03-07 RX ORDER — DEXTROAMPHETAMINE SACCHARATE, AMPHETAMINE ASPARTATE MONOHYDRATE, DEXTROAMPHETAMINE SULFATE AND AMPHETAMINE SULFATE 5; 5; 5; 5 MG/1; MG/1; MG/1; MG/1
20 CAPSULE, EXTENDED RELEASE ORAL EVERY MORNING
Qty: 30 CAPSULE | Refills: 0 | Status: SHIPPED | OUTPATIENT
Start: 2024-03-07 | End: 2024-04-06

## 2024-03-07 RX ORDER — DEXTROAMPHETAMINE SACCHARATE, AMPHETAMINE ASPARTATE MONOHYDRATE, DEXTROAMPHETAMINE SULFATE AND AMPHETAMINE SULFATE 5; 5; 5; 5 MG/1; MG/1; MG/1; MG/1
40 CAPSULE, EXTENDED RELEASE ORAL EVERY MORNING
Qty: 60 CAPSULE | Refills: 0 | Status: SHIPPED | OUTPATIENT
Start: 2024-03-07 | End: 2024-04-06

## 2024-03-07 RX ORDER — DEXTROAMPHETAMINE SACCHARATE, AMPHETAMINE ASPARTATE MONOHYDRATE, DEXTROAMPHETAMINE SULFATE AND AMPHETAMINE SULFATE 5; 5; 5; 5 MG/1; MG/1; MG/1; MG/1
40 CAPSULE, EXTENDED RELEASE ORAL EVERY MORNING
Qty: 60 CAPSULE | Refills: 0 | Status: SHIPPED | OUTPATIENT
Start: 2024-05-06 | End: 2024-06-05

## 2024-03-07 RX ORDER — DEXTROAMPHETAMINE SACCHARATE, AMPHETAMINE ASPARTATE MONOHYDRATE, DEXTROAMPHETAMINE SULFATE AND AMPHETAMINE SULFATE 5; 5; 5; 5 MG/1; MG/1; MG/1; MG/1
40 CAPSULE, EXTENDED RELEASE ORAL EVERY MORNING
Qty: 60 CAPSULE | Refills: 0 | Status: SHIPPED | OUTPATIENT
Start: 2024-04-06 | End: 2024-05-06

## 2024-04-25 ENCOUNTER — PATIENT MESSAGE (OUTPATIENT)
Dept: PEDIATRICS | Facility: CLINIC | Age: 19
End: 2024-04-25
Payer: COMMERCIAL

## 2024-04-29 ENCOUNTER — LAB VISIT (OUTPATIENT)
Dept: LAB | Facility: HOSPITAL | Age: 19
End: 2024-04-29
Attending: PEDIATRICS
Payer: COMMERCIAL

## 2024-04-29 ENCOUNTER — OFFICE VISIT (OUTPATIENT)
Dept: PEDIATRICS | Facility: CLINIC | Age: 19
End: 2024-04-29
Payer: COMMERCIAL

## 2024-04-29 VITALS — WEIGHT: 189.63 LBS | TEMPERATURE: 98 F | BODY MASS INDEX: 24.84 KG/M2

## 2024-04-29 DIAGNOSIS — B00.9 POLYMERASE CHAIN REACTION DNA TEST POSITIVE FOR HERPES SIMPLEX VIRUS TYPE 2 (HSV-2): ICD-10-CM

## 2024-04-29 DIAGNOSIS — Z11.3 SCREENING EXAMINATION FOR STD (SEXUALLY TRANSMITTED DISEASE): ICD-10-CM

## 2024-04-29 DIAGNOSIS — Z11.3 SCREENING EXAMINATION FOR STD (SEXUALLY TRANSMITTED DISEASE): Primary | ICD-10-CM

## 2024-04-29 PROCEDURE — 3008F BODY MASS INDEX DOCD: CPT | Mod: CPTII,S$GLB,, | Performed by: PEDIATRICS

## 2024-04-29 PROCEDURE — 1159F MED LIST DOCD IN RCRD: CPT | Mod: CPTII,S$GLB,, | Performed by: PEDIATRICS

## 2024-04-29 PROCEDURE — 99213 OFFICE O/P EST LOW 20 MIN: CPT | Mod: S$GLB,,, | Performed by: PEDIATRICS

## 2024-04-29 PROCEDURE — 99999 PR PBB SHADOW E&M-EST. PATIENT-LVL III: CPT | Mod: PBBFAC,,, | Performed by: PEDIATRICS

## 2024-04-29 PROCEDURE — 87529 HSV DNA AMP PROBE: CPT | Mod: 59 | Performed by: PEDIATRICS

## 2024-04-29 PROCEDURE — 36415 COLL VENOUS BLD VENIPUNCTURE: CPT | Mod: PO | Performed by: PEDIATRICS

## 2024-04-29 NOTE — PATIENT INSTRUCTIONS
[facility] Santiago[facility]  [facility] Adolescent [facility]  [phone]    04/29/2024      Labwork            Max Gray (2005) is scheduled for lab work.    This can be performed thru [facility] at:    [facility]  [address] [place]  [address]  [phone]    [facility]  [address] [place] [address].  [place]  [address] [phone]    [facility] [address]  [address] [place] [address] [place]  [address]  [phone]    [facility]  [address] [place]  [address]  [phone]    [facility]  [address]  [place]  [address]  [phone]    [facility]  [address] [place]  [address]  [phone]    Other locations can be found by logging in to:  [facility]  [email or web address]       GEORGIE Del Toro II.

## 2024-04-29 NOTE — PROGRESS NOTES
SUBJECTIVE:  Max Gray is a 18 y.o. male here alone, who is a historian.    HPI  Patient presents to the clinic for a follow up on recent results for STD/ STI testing. Other tests were okay.  Pt would like to be screened again before having sexual relations with partner. 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 Pt has been sexually active receiving oral sex from  multiple female partners. Pt has been in a committed relationship with one female partner for the last 2 years.  Pt denies any pain when urinating.    No history of penile lesions.        Max's allergies, medications, history, and problem list were updated as appropriate.    Review of Systems  A comprehensive review of symptoms was completed and negative except as noted in the HPI.    OBJECTIVE:  Vital signs  Vitals:    04/29/24 1347   Temp: 98.3 °F (36.8 °C)   TempSrc: Oral   Weight: 86 kg (189 lb 9.6 oz)        Physical Exam      ASSESSMENT/PLAN:  Max was seen today for std check.    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    Age appropriate physical activity and nutritional counseling were completed during today's visit.     Follow Up:  No follow-ups on file.    HSV PCR test ordered.

## 2024-05-02 LAB
HSV-1 DNA BY PCR: NEGATIVE
HSV-2 DNA BY PCR: NEGATIVE

## 2024-06-04 ENCOUNTER — OFFICE VISIT (OUTPATIENT)
Dept: PEDIATRICS | Facility: CLINIC | Age: 19
End: 2024-06-04
Payer: COMMERCIAL

## 2024-06-04 VITALS
HEIGHT: 73 IN | BODY MASS INDEX: 25.89 KG/M2 | TEMPERATURE: 97 F | WEIGHT: 195.38 LBS | SYSTOLIC BLOOD PRESSURE: 123 MMHG | DIASTOLIC BLOOD PRESSURE: 79 MMHG | HEART RATE: 73 BPM

## 2024-06-04 DIAGNOSIS — F90.9 ATTENTION DEFICIT HYPERACTIVITY DISORDER (ADHD), UNSPECIFIED ADHD TYPE: Primary | ICD-10-CM

## 2024-06-04 PROCEDURE — 99999 PR PBB SHADOW E&M-EST. PATIENT-LVL III: CPT | Mod: PBBFAC,,, | Performed by: PEDIATRICS

## 2024-06-04 PROCEDURE — 1160F RVW MEDS BY RX/DR IN RCRD: CPT | Mod: CPTII,S$GLB,, | Performed by: PEDIATRICS

## 2024-06-04 PROCEDURE — 3074F SYST BP LT 130 MM HG: CPT | Mod: CPTII,S$GLB,, | Performed by: PEDIATRICS

## 2024-06-04 PROCEDURE — 3078F DIAST BP <80 MM HG: CPT | Mod: CPTII,S$GLB,, | Performed by: PEDIATRICS

## 2024-06-04 PROCEDURE — 1159F MED LIST DOCD IN RCRD: CPT | Mod: CPTII,S$GLB,, | Performed by: PEDIATRICS

## 2024-06-04 PROCEDURE — 99213 OFFICE O/P EST LOW 20 MIN: CPT | Mod: S$GLB,,, | Performed by: PEDIATRICS

## 2024-06-04 PROCEDURE — 3008F BODY MASS INDEX DOCD: CPT | Mod: CPTII,S$GLB,, | Performed by: PEDIATRICS

## 2024-06-04 RX ORDER — DEXTROAMPHETAMINE SACCHARATE, AMPHETAMINE ASPARTATE MONOHYDRATE, DEXTROAMPHETAMINE SULFATE AND AMPHETAMINE SULFATE 5; 5; 5; 5 MG/1; MG/1; MG/1; MG/1
20 CAPSULE, EXTENDED RELEASE ORAL EVERY MORNING
Qty: 30 CAPSULE | Refills: 0 | Status: SHIPPED | OUTPATIENT
Start: 2024-07-04 | End: 2024-08-03

## 2024-06-04 RX ORDER — DEXTROAMPHETAMINE SACCHARATE, AMPHETAMINE ASPARTATE MONOHYDRATE, DEXTROAMPHETAMINE SULFATE AND AMPHETAMINE SULFATE 5; 5; 5; 5 MG/1; MG/1; MG/1; MG/1
20 CAPSULE, EXTENDED RELEASE ORAL EVERY MORNING
Qty: 30 CAPSULE | Refills: 0 | Status: SHIPPED | OUTPATIENT
Start: 2024-06-04 | End: 2024-07-04

## 2024-06-04 RX ORDER — DEXTROAMPHETAMINE SACCHARATE, AMPHETAMINE ASPARTATE MONOHYDRATE, DEXTROAMPHETAMINE SULFATE AND AMPHETAMINE SULFATE 5; 5; 5; 5 MG/1; MG/1; MG/1; MG/1
20 CAPSULE, EXTENDED RELEASE ORAL EVERY MORNING
Qty: 30 CAPSULE | Refills: 0 | Status: SHIPPED | OUTPATIENT
Start: 2024-08-03 | End: 2024-09-02

## 2024-06-04 NOTE — PROGRESS NOTES
"SUBJECTIVE:  Max Gray is a 18 y.o. male here alone for an ADHD follow up.    HPI  Current medication and dose: Adderall XR 20 mg BID  Taking daily? Yes  School/grade: working at [employer]  Current performance: going well  Accommodations? Yes  Concerns? no    Daria allergies, medications, history, and problem list were updated as appropriate.    Review of Systems  A comprehensive review of symptoms was completed and negative except as noted in the HPI.    OBJECTIVE:  Vital signs  Vitals:    06/04/24 1010   BP: 123/79   BP Location: Left arm   Patient Position: Sitting   BP Method: Medium (Automatic)   Pulse: 73   Temp: 97.4 °F (36.3 °C)   TempSrc: Oral   Weight: 88.6 kg (195 lb 6.4 oz)   Height: 6' 1" (1.854 m)        Physical Exam  Vitals reviewed.   Constitutional:       General: He is not in acute distress.  HENT:      Right Ear: Tympanic membrane normal.      Left Ear: Tympanic membrane normal.      Nose: Nose normal.      Mouth/Throat:      Pharynx: Oropharynx is clear.   Cardiovascular:      Rate and Rhythm: Normal rate and regular rhythm.      Heart sounds: Normal heart sounds.   Pulmonary:      Breath sounds: Normal breath sounds.   Skin:     Findings: No rash.            ASSESSMENT/PLAN:  Max was seen today for adhd, medication management and medication refill.    Diagnoses and all orders for this visit:    Attention deficit hyperactivity disorder (ADHD), unspecified ADHD type  -     dextroamphetamine-amphetamine (ADDERALL XR) 20 MG 24 hr capsule; Take 1 capsule (20 mg total) by mouth every morning.  -     dextroamphetamine-amphetamine (ADDERALL XR) 20 MG 24 hr capsule; Take 1 capsule (20 mg total) by mouth every morning.  -     dextroamphetamine-amphetamine (ADDERALL XR) 20 MG 24 hr capsule; Take 1 capsule (20 mg total) by mouth every morning.         Age appropriate physical activity and nutritional counseling were completed during today's visit.     Follow Up:  3 months for " medication recheck.

## 2024-07-08 ENCOUNTER — PATIENT MESSAGE (OUTPATIENT)
Dept: PEDIATRICS | Facility: CLINIC | Age: 19
End: 2024-07-08
Payer: COMMERCIAL

## 2024-07-08 RX ORDER — DEXTROAMPHETAMINE SACCHARATE, AMPHETAMINE ASPARTATE MONOHYDRATE, DEXTROAMPHETAMINE SULFATE AND AMPHETAMINE SULFATE 5; 5; 5; 5 MG/1; MG/1; MG/1; MG/1
20 CAPSULE, EXTENDED RELEASE ORAL 2 TIMES DAILY
Qty: 60 CAPSULE | Refills: 0 | Status: SHIPPED | OUTPATIENT
Start: 2024-07-08 | End: 2024-08-07

## 2024-07-11 DIAGNOSIS — F90.9 ATTENTION DEFICIT HYPERACTIVITY DISORDER (ADHD), UNSPECIFIED ADHD TYPE: ICD-10-CM

## 2024-07-11 RX ORDER — DEXTROAMPHETAMINE SACCHARATE, AMPHETAMINE ASPARTATE MONOHYDRATE, DEXTROAMPHETAMINE SULFATE AND AMPHETAMINE SULFATE 5; 5; 5; 5 MG/1; MG/1; MG/1; MG/1
20 CAPSULE, EXTENDED RELEASE ORAL 2 TIMES DAILY
Qty: 60 CAPSULE | Refills: 0 | Status: SHIPPED | OUTPATIENT
Start: 2024-07-11 | End: 2024-08-10

## 2024-07-30 ENCOUNTER — OFFICE VISIT (OUTPATIENT)
Dept: PEDIATRICS | Facility: CLINIC | Age: 19
End: 2024-07-30
Payer: COMMERCIAL

## 2024-07-30 VITALS
BODY MASS INDEX: 25.08 KG/M2 | SYSTOLIC BLOOD PRESSURE: 126 MMHG | WEIGHT: 189.25 LBS | HEART RATE: 72 BPM | TEMPERATURE: 97 F | DIASTOLIC BLOOD PRESSURE: 76 MMHG | HEIGHT: 73 IN

## 2024-07-30 DIAGNOSIS — F90.2 ADHD (ATTENTION DEFICIT HYPERACTIVITY DISORDER), COMBINED TYPE: Primary | ICD-10-CM

## 2024-07-30 DIAGNOSIS — R00.0 TACHYCARDIA: ICD-10-CM

## 2024-07-30 DIAGNOSIS — Z00.00 WELL ADULT EXAM: ICD-10-CM

## 2024-07-30 DIAGNOSIS — M54.50 LOW BACK PAIN, UNSPECIFIED BACK PAIN LATERALITY, UNSPECIFIED CHRONICITY, UNSPECIFIED WHETHER SCIATICA PRESENT: ICD-10-CM

## 2024-07-30 PROCEDURE — 99999 PR PBB SHADOW E&M-EST. PATIENT-LVL III: CPT | Mod: PBBFAC,,, | Performed by: PEDIATRICS

## 2024-07-30 RX ORDER — DEXTROAMPHETAMINE SACCHARATE, AMPHETAMINE ASPARTATE MONOHYDRATE, DEXTROAMPHETAMINE SULFATE AND AMPHETAMINE SULFATE 5; 5; 5; 5 MG/1; MG/1; MG/1; MG/1
40 CAPSULE, EXTENDED RELEASE ORAL EVERY MORNING
Qty: 60 CAPSULE | Refills: 0 | Status: SHIPPED | OUTPATIENT
Start: 2024-08-29 | End: 2024-09-28

## 2024-07-30 RX ORDER — DEXTROAMPHETAMINE SACCHARATE, AMPHETAMINE ASPARTATE MONOHYDRATE, DEXTROAMPHETAMINE SULFATE AND AMPHETAMINE SULFATE 5; 5; 5; 5 MG/1; MG/1; MG/1; MG/1
40 CAPSULE, EXTENDED RELEASE ORAL EVERY MORNING
Qty: 60 CAPSULE | Refills: 0 | Status: SHIPPED | OUTPATIENT
Start: 2024-09-28 | End: 2024-10-28

## 2024-07-30 RX ORDER — DEXTROAMPHETAMINE SACCHARATE, AMPHETAMINE ASPARTATE MONOHYDRATE, DEXTROAMPHETAMINE SULFATE AND AMPHETAMINE SULFATE 5; 5; 5; 5 MG/1; MG/1; MG/1; MG/1
40 CAPSULE, EXTENDED RELEASE ORAL EVERY MORNING
Qty: 60 CAPSULE | Refills: 0 | Status: SHIPPED | OUTPATIENT
Start: 2024-07-30 | End: 2024-08-29

## 2024-07-30 RX ORDER — NAPROXEN 500 MG/1
500 TABLET ORAL 2 TIMES DAILY WITH MEALS
Qty: 60 TABLET | Refills: 1 | Status: SHIPPED | OUTPATIENT
Start: 2024-07-30

## 2024-07-30 NOTE — PATIENT INSTRUCTIONS
Dr. Santiago, [facility]  Pediatric and Adolescent Medicine  [phone]        YOUNG ADULT      Nutrition/Hydration:  Limit snacks, fast food, dessert, junk food.  Eat regular meals  >1/3 adults are overweight, which affects your health and longevity  Drink lots of water each day    Teeth:  -  FLOSS, brush minimum twice a day  -  Dentist regularly  - You do want teeth after fifty years old?    Toxics:  Limit alcohol   > 2 drinks per day signals problems  Never drink and drive  Avoid drugs, tobacco    Driving:  over 30,000 die each year MVAs in [place]  #1 cause of death (almost half) in 20's  Look three cars ahead  Do NOT text and drive  Wear your seat belt  Use your mirrors with constant surveillance    Job/Profession/Vocation:  Continue to learn all your life  Listen more than you talk.  Form solid relationships at work  DO NOT Gossip      Social Media:  - Be cautious what you put on social media- available to many and more permament than you think  - Your reputation will influence jobs and much else in the future.  - Social media posts are more permament than you think.    Finances:  Work on budgeting  Start detention savings NOW  Maximize your company 401k  Maximize your HERNANDEZ HOLLIE- [employer] or [employer]- Index [place] Fund- 500 Index- $7500 per year  At least 10% of your income to penitentiary  The money you put aside in your young adult life grows exponentially (40X)    Dating/Relationships:  - So important to find the right partner at the right time  You will have found the right partner when you work at meeting their needs and they, instinctually, meet your needs  Always uplift and affirm your partner to others with your words and actions.  Remember RESPECT, LOVE and KINDNESS  Put your relationship first- you do not have to be right in all situations.  Conflict resolution through communication  Allow freedom for your spouse    Exercise:  Don't be a couch potato  Work toward an hour each day of  aerobic exercise (min 30 minutes)  Play tennis, pickle ball, golf, swim, walk/run, etc    Prevention/Screenings:  Wear sunscreen, SPF >20, when in the sun  Prevent sexually transmitted disease  If family history (<55 years old) of heart disease, high cholesterol-have lipid panel (blood work)    Immunizations:  - TdaP (Tetanus)- within last 10 years  - Influenzae (FLU)- every year

## 2024-07-30 NOTE — PROGRESS NOTES
"  Subjective  Max Gray is a 18 y.o. male who is here for a checkup alone, who is a historian.      Subjective:     HISTORY:    Interval History / Parental Concerns: Pt's heart rate at work x 3 weeks ago was higher than normal according to his apple watch. Pt felt some chest pains. Pt is no longer experiencing any of these issues. Pt believes it was due to taking his Adderall Plus drinking an energy drink, hot outside and not eating anything that day.    One day- 3 weeks ago- drank energy drink, no sleep heart rate went up to 178 for short while.  Resolved- never happened again.    Occupation: "[employer]'s on [employer]    Progress/Grades:  Going Well    Concerns:  None          Subjective:   HPI:    his  ADHD symptoms have improved since last visit.    Attentive with homework: Yes    Side effects of Medicine:  Sleep, appetite or other? None    Current ADHD Medication/Dose: two doses of Adderall XR 20 mg taken together in the morning taken daily, no afternoon dose.           Review of patient's allergies indicates:  No Known Allergies    Patient Active Problem List   Diagnosis    ADHD (attention deficit hyperactivity disorder), combined type    Back pain    Tachycardia           Objective:      PHYSICAL EXAM  Vitals:    07/30/24 0835   BP: 126/76   BP Location: Left arm   Patient Position: Sitting   BP Method: Medium (Automatic)   Pulse: 72   Temp: 97.4 °F (36.3 °C)   TempSrc: Oral   Weight: 85.8 kg (189 lb 4 oz)   Height: 6' 0.5" (1.842 m)         Height Percentile for Age  Facility age limit for growth %akua is 20 years.    Weight Percentile for Age  Facility age limit for growth %akua is 20 years.    Body Mass Index  Body mass index is 25.31 kg/m².  Facility age limit for growth %akua is 20 years.      Physical Exam  Vitals reviewed.   Constitutional:       Appearance: Normal appearance.   HENT:      Right Ear: Tympanic membrane normal.      Left Ear: Tympanic membrane normal.      Nose: Nose normal.      " Mouth/Throat:      Pharynx: Oropharynx is clear.   Eyes:      Conjunctiva/sclera: Conjunctivae normal.   Cardiovascular:      Rate and Rhythm: Normal rate and regular rhythm.      Heart sounds: Normal heart sounds. No murmur heard.     No friction rub. No gallop.   Pulmonary:      Breath sounds: Normal breath sounds.   Abdominal:      Palpations: Abdomen is soft.      Tenderness: There is no abdominal tenderness.   Musculoskeletal:         General: Normal range of motion.      Cervical back: Neck supple.   Skin:     Findings: No rash.   Neurological:      General: No focal deficit present.           Assessment/Plan:      ADHD (attention deficit hyperactivity disorder), combined type  -     dextroamphetamine-amphetamine (ADDERALL XR) 20 MG 24 hr capsule; Take 2 capsules (40 mg total) by mouth every morning.  Dispense: 60 capsule; Refill: 0  -     dextroamphetamine-amphetamine (ADDERALL XR) 20 MG 24 hr capsule; Take 2 capsules (40 mg total) by mouth every morning.  Dispense: 60 capsule; Refill: 0  -     dextroamphetamine-amphetamine (ADDERALL XR) 20 MG 24 hr capsule; Take 2 capsules (40 mg total) by mouth every morning.  Dispense: 60 capsule; Refill: 0    Well adult exam  -     Tdap (BOOSTRIX) vaccine injection 0.5 mL    Tachycardia    Low back pain, unspecified back pain laterality, unspecified chronicity, unspecified whether sciatica present  -     naproxen (NAPROSYN) 500 MG tablet; Take 1 tablet (500 mg total) by mouth 2 (two) times daily with meals.  Dispense: 60 tablet; Refill: 1      Healthy     PLAN:  1.  Discussed anticipatory guidance (including nutrition, education, safety, dental care, discipline, family, exercise, physical acticvity) and given age appropriate hand out.   Age appropriate physical activity and nutritional counseling were completed during today's visit.  2.  Immunizations received.  May give Acetaminophen (Tylenol).  3.  Discussed after hours care and advice - call [phone] (our  office).  4.  Follow-up at next well child visit (Regular Supervision Visit) in one year or sooner prn.        Next scheduled follow-up appointment for ADD/ADHD management will be in 3 months.  If changes are made to medications, then will need to follow up in three to four weeks.    We discussed the management goals for patients with ADHD:  1. Adequate Control of Focus and/or Behavior.  2. Tolerable Medication Side Effects (Including some Loss of Appetite).  Need to maintain weight.  3. Function well in life, school and/or work.          Cardiologist if continues

## 2024-08-26 DIAGNOSIS — F90.9 ATTENTION DEFICIT HYPERACTIVITY DISORDER (ADHD), UNSPECIFIED ADHD TYPE: ICD-10-CM

## 2024-08-26 RX ORDER — DEXTROAMPHETAMINE SACCHARATE, AMPHETAMINE ASPARTATE MONOHYDRATE, DEXTROAMPHETAMINE SULFATE AND AMPHETAMINE SULFATE 5; 5; 5; 5 MG/1; MG/1; MG/1; MG/1
20 CAPSULE, EXTENDED RELEASE ORAL EVERY MORNING
Qty: 30 CAPSULE | Refills: 0 | Status: SHIPPED | OUTPATIENT
Start: 2024-08-26 | End: 2024-09-25

## 2024-08-26 NOTE — TELEPHONE ENCOUNTER
Pharmacy change, pt verified in stock. Last seen 7/30/24. Pt notified will rx will be available at pharmacy once Dr. Santiago signs it. Pt v/u.   ----- Message from Fiordaliza Wilson MA sent at 8/26/2024 11:59 AM CDT -----  Regarding: Pharmacy change  Contact: Self  Pt would like his adderall xr 20 mg Rx [facility] to [facility] [address] [facility] of [facility] [address].    # [phone]

## 2024-08-28 DIAGNOSIS — F90.2 ADHD (ATTENTION DEFICIT HYPERACTIVITY DISORDER), COMBINED TYPE: ICD-10-CM

## 2024-08-28 RX ORDER — DEXTROAMPHETAMINE SACCHARATE, AMPHETAMINE ASPARTATE MONOHYDRATE, DEXTROAMPHETAMINE SULFATE AND AMPHETAMINE SULFATE 5; 5; 5; 5 MG/1; MG/1; MG/1; MG/1
40 CAPSULE, EXTENDED RELEASE ORAL EVERY MORNING
Qty: 60 CAPSULE | Refills: 0 | Status: SHIPPED | OUTPATIENT
Start: 2024-08-28 | End: 2024-09-27

## 2024-08-28 NOTE — TELEPHONE ENCOUNTER
----- Message from Claire Martin sent at 8/28/2024 10:58 AM CDT -----  Regarding: quantity of RX  Contact: Max [phone]  Pt states that the Rx was sent in as 30 count but it was supposed to be 60, he takes 2 in the mornings. Adderall XR 20     Max   [phone]

## 2024-08-28 NOTE — TELEPHONE ENCOUNTER
Pt seen 7/30/24, most recent refill Adderall XR 20mg d/t pharm change, #30, needs #60. Takes 2 qam

## 2024-09-03 ENCOUNTER — PATIENT MESSAGE (OUTPATIENT)
Dept: PEDIATRICS | Facility: CLINIC | Age: 19
End: 2024-09-03
Payer: COMMERCIAL

## 2024-09-13 DIAGNOSIS — F90.9 ATTENTION DEFICIT HYPERACTIVITY DISORDER (ADHD), UNSPECIFIED ADHD TYPE: ICD-10-CM

## 2024-09-13 RX ORDER — DEXTROAMPHETAMINE SACCHARATE, AMPHETAMINE ASPARTATE MONOHYDRATE, DEXTROAMPHETAMINE SULFATE AND AMPHETAMINE SULFATE 5; 5; 5; 5 MG/1; MG/1; MG/1; MG/1
20 CAPSULE, EXTENDED RELEASE ORAL EVERY MORNING
Qty: 30 CAPSULE | Refills: 0 | Status: SHIPPED | OUTPATIENT
Start: 2024-09-13 | End: 2024-10-13

## 2024-09-13 NOTE — TELEPHONE ENCOUNTER
Patient takes Adderall XR 20 mg #60 but rx was sent for #30 last month. Patient was told to wait 15 days and then notify us to refill rx. 15 days as of Tues. [facility].

## 2024-09-16 ENCOUNTER — OFFICE VISIT (OUTPATIENT)
Dept: PEDIATRICS | Facility: CLINIC | Age: 19
End: 2024-09-16
Payer: COMMERCIAL

## 2024-09-16 VITALS
SYSTOLIC BLOOD PRESSURE: 131 MMHG | DIASTOLIC BLOOD PRESSURE: 79 MMHG | BODY MASS INDEX: 25.63 KG/M2 | WEIGHT: 193.38 LBS | HEIGHT: 73 IN | TEMPERATURE: 99 F | HEART RATE: 105 BPM

## 2024-09-16 DIAGNOSIS — J02.0 STREP PHARYNGITIS: ICD-10-CM

## 2024-09-16 DIAGNOSIS — F90.2 ADHD (ATTENTION DEFICIT HYPERACTIVITY DISORDER), COMBINED TYPE: Primary | ICD-10-CM

## 2024-09-16 DIAGNOSIS — J06.9 UPPER RESPIRATORY TRACT INFECTION, UNSPECIFIED TYPE: ICD-10-CM

## 2024-09-16 DIAGNOSIS — J02.9 PHARYNGITIS, UNSPECIFIED ETIOLOGY: ICD-10-CM

## 2024-09-16 LAB
CTP QC/QA: YES
CTP QC/QA: YES
POC MOLECULAR INFLUENZA A AGN: NEGATIVE
POC MOLECULAR INFLUENZA B AGN: NEGATIVE
S PYO RRNA THROAT QL PROBE: POSITIVE

## 2024-09-16 PROCEDURE — 99999 PR PBB SHADOW E&M-EST. PATIENT-LVL III: CPT | Mod: PBBFAC,,, | Performed by: PEDIATRICS

## 2024-09-16 RX ORDER — DEXTROAMPHETAMINE SACCHARATE, AMPHETAMINE ASPARTATE MONOHYDRATE, DEXTROAMPHETAMINE SULFATE AND AMPHETAMINE SULFATE 5; 5; 5; 5 MG/1; MG/1; MG/1; MG/1
40 CAPSULE, EXTENDED RELEASE ORAL EVERY MORNING
Qty: 60 CAPSULE | Refills: 0 | Status: SHIPPED | OUTPATIENT
Start: 2024-10-16 | End: 2024-11-15

## 2024-09-16 RX ORDER — DEXTROAMPHETAMINE SACCHARATE, AMPHETAMINE ASPARTATE MONOHYDRATE, DEXTROAMPHETAMINE SULFATE AND AMPHETAMINE SULFATE 5; 5; 5; 5 MG/1; MG/1; MG/1; MG/1
20 CAPSULE, EXTENDED RELEASE ORAL EVERY MORNING
Qty: 30 CAPSULE | Refills: 0 | Status: CANCELLED | OUTPATIENT
Start: 2024-10-16 | End: 2024-11-15

## 2024-09-16 RX ORDER — DEXTROAMPHETAMINE SACCHARATE, AMPHETAMINE ASPARTATE MONOHYDRATE, DEXTROAMPHETAMINE SULFATE AND AMPHETAMINE SULFATE 5; 5; 5; 5 MG/1; MG/1; MG/1; MG/1
20 CAPSULE, EXTENDED RELEASE ORAL EVERY MORNING
Qty: 30 CAPSULE | Refills: 0 | Status: CANCELLED | OUTPATIENT
Start: 2024-09-16 | End: 2024-10-16

## 2024-09-16 RX ORDER — DEXTROMETHORPHAN HYDROBROMIDE, GUAIFENESIN AND PHENYLEPHRINE HYDROCHLORIDE 15; 400; 10 MG/1; MG/1; MG/1
1 TABLET ORAL
Qty: 30 TABLET | Refills: 0 | Status: SHIPPED | OUTPATIENT
Start: 2024-09-16

## 2024-09-16 RX ORDER — AMOXICILLIN 875 MG/1
875 TABLET, FILM COATED ORAL 2 TIMES DAILY
Qty: 20 TABLET | Refills: 0 | Status: SHIPPED | OUTPATIENT
Start: 2024-09-16 | End: 2024-09-26

## 2024-09-16 RX ORDER — DEXTROAMPHETAMINE SACCHARATE, AMPHETAMINE ASPARTATE MONOHYDRATE, DEXTROAMPHETAMINE SULFATE AND AMPHETAMINE SULFATE 5; 5; 5; 5 MG/1; MG/1; MG/1; MG/1
40 CAPSULE, EXTENDED RELEASE ORAL EVERY MORNING
Qty: 60 CAPSULE | Refills: 0 | Status: SHIPPED | OUTPATIENT
Start: 2024-09-16 | End: 2024-10-16

## 2024-09-16 RX ORDER — DEXTROAMPHETAMINE SACCHARATE, AMPHETAMINE ASPARTATE MONOHYDRATE, DEXTROAMPHETAMINE SULFATE AND AMPHETAMINE SULFATE 5; 5; 5; 5 MG/1; MG/1; MG/1; MG/1
20 CAPSULE, EXTENDED RELEASE ORAL EVERY MORNING
Qty: 30 CAPSULE | Refills: 0 | Status: CANCELLED | OUTPATIENT
Start: 2024-11-15 | End: 2024-12-15

## 2024-09-16 RX ORDER — DEXTROAMPHETAMINE SACCHARATE, AMPHETAMINE ASPARTATE MONOHYDRATE, DEXTROAMPHETAMINE SULFATE AND AMPHETAMINE SULFATE 5; 5; 5; 5 MG/1; MG/1; MG/1; MG/1
40 CAPSULE, EXTENDED RELEASE ORAL EVERY MORNING
Qty: 60 CAPSULE | Refills: 0 | Status: SHIPPED | OUTPATIENT
Start: 2024-11-15 | End: 2024-12-15

## 2024-09-16 NOTE — PROGRESS NOTES
"    Subjective:   HPI:  Max Gray is a 18 y.o. patient here for follow up ADHD visit,  alone, who is a historian    his  ADHD symptoms have improved since last visit.    Attentive in afternoon (with homework): Yes    Side effects of Medicine:    Trouble Sleeping; Appetite Changes; Jitteriness; Irritability or moodiness; Headaches or Stomach Aches; Other? None    Occupation: at [employer]   Performance: Going well    Accommodations? No    Current ADHD Medication/Dose in am: Two doses of Adderall XR 20 mg (40 mg) taken together   Afternoon medicine?   Dose- None   Taking daily? Yes    Concerns? no      Last RHS:  7/30/24    SUBJECTIVE:      HPI  Patient presents to the clinic with concerns about  sore throat, body aches, headaches and chills x 1 day. Pt never took his temperature but believes he had a high fever yesterday. Pt recently went on a cruise and is concerned he might have the Flu. Pt vomited x 3 days while on the cruise, pt has not been nauseous since. Pt denies diarrhea.       Max's allergies, medications, history, and problem list were updated as appropriate.    Review of Systems  A comprehensive review of symptoms was completed and negative except as noted in the HPI.          Max's allergies, medications, history, and problem list were updated as appropriate.    Review of patient's allergies indicates:  No Known Allergies    Patient Active Problem List   Diagnosis    ADHD (attention deficit hyperactivity disorder), combined type    Back pain    Tachycardia         Review of Systems  A comprehensive review of symptoms was completed and negative except as noted in the HPI.      Objective:     Vitals:    09/16/24 1057   BP: 131/79   BP Location: Right arm   Patient Position: Sitting   BP Method: Medium (Automatic)   Pulse: 105   Temp: 99 °F (37.2 °C)   TempSrc: Oral   Weight: 87.7 kg (193 lb 6.4 oz)   Height: 6' 1" (1.854 m)           Physical Exam  Vitals reviewed. "   Constitutional:       Appearance: Normal appearance.   HENT:      Right Ear: Tympanic membrane normal.      Left Ear: Tympanic membrane normal.      Nose: Nose normal.      Mouth/Throat:      Mouth: Mucous membranes are moist.      Pharynx: Posterior oropharyngeal erythema present.   Eyes:      Conjunctiva/sclera: Conjunctivae normal.   Cardiovascular:      Rate and Rhythm: Normal rate and regular rhythm.      Heart sounds: Normal heart sounds. No murmur heard.  Pulmonary:      Effort: Pulmonary effort is normal. No respiratory distress.      Breath sounds: Normal breath sounds. No stridor. No wheezing.   Abdominal:      General: Abdomen is flat.      Tenderness: There is no abdominal tenderness. There is no guarding.   Musculoskeletal:         General: Normal range of motion.      Cervical back: Neck supple.   Neurological:      General: No focal deficit present.      Mental Status: He is alert. Mental status is at baseline.      Motor: No weakness.   Psychiatric:         Mood and Affect: Mood normal.         Behavior: Behavior normal.           Assessment/Plan:        ADHD (attention deficit hyperactivity disorder), combined type  -     dextroamphetamine-amphetamine (ADDERALL XR) 20 MG 24 hr capsule; Take 2 capsules (40 mg total) by mouth every morning.  Dispense: 60 capsule; Refill: 0  -     dextroamphetamine-amphetamine (ADDERALL XR) 20 MG 24 hr capsule; Take 2 capsules (40 mg total) by mouth every morning.  Dispense: 60 capsule; Refill: 0  -     dextroamphetamine-amphetamine (ADDERALL XR) 20 MG 24 hr capsule; Take 2 capsules (40 mg total) by mouth every morning.  Dispense: 60 capsule; Refill: 0    Pharyngitis, unspecified etiology  -     POCT rapid strep A    Upper respiratory tract infection, unspecified type  -     POCT Influenza A/B Molecular  -     phenylephrine-DM-guaiFENesin (AQUANAZ) 10- mg Tab; Take 1 tablet by mouth every 4 to 6 hours as needed (for congestion/cough).  Dispense: 30 tablet;  Refill: 0    Strep pharyngitis    Other orders  -     amoxicillin (AMOXIL) 875 MG tablet; Take 1 tablet (875 mg total) by mouth 2 (two) times daily. for 10 days  Dispense: 20 tablet; Refill: 0      HO- Pharyngitis    Handout provided  Follow instructions listed on hand out for treatment  Call or return to clinic if worsens or does not resolve          Outpatient Encounter Medications as of 9/16/2024   Medication Sig Dispense Refill    [START ON 9/28/2024] dextroamphetamine-amphetamine (ADDERALL XR) 20 MG 24 hr capsule Take 2 capsules (40 mg total) by mouth every morning. 60 capsule 0    dextroamphetamine-amphetamine (ADDERALL XR) 20 MG 24 hr capsule Take 2 capsules (40 mg total) by mouth every morning. 60 capsule 0    amoxicillin (AMOXIL) 875 MG tablet Take 1 tablet (875 mg total) by mouth 2 (two) times daily. for 10 days 20 tablet 0    dextroamphetamine-amphetamine (ADDERALL XR) 20 MG 24 hr capsule Take 2 capsules (40 mg total) by mouth every morning. (Patient not taking: Reported on 9/16/2024) 60 capsule 0    dextroamphetamine-amphetamine (ADDERALL XR) 20 MG 24 hr capsule Take 1 capsule (20 mg total) by mouth every morning. (Patient not taking: Reported on 9/16/2024) 30 capsule 0    dextroamphetamine-amphetamine (ADDERALL XR) 20 MG 24 hr capsule Take 2 capsules (40 mg total) by mouth every morning. 60 capsule 0    [START ON 10/16/2024] dextroamphetamine-amphetamine (ADDERALL XR) 20 MG 24 hr capsule Take 2 capsules (40 mg total) by mouth every morning. 60 capsule 0    [START ON 11/15/2024] dextroamphetamine-amphetamine (ADDERALL XR) 20 MG 24 hr capsule Take 2 capsules (40 mg total) by mouth every morning. 60 capsule 0    naproxen (NAPROSYN) 500 MG tablet Take 1 tablet (500 mg total) by mouth 2 (two) times daily with meals. (Patient not taking: Reported on 7/30/2024) 60 tablet 1    naproxen (NAPROSYN) 500 MG tablet Take 1 tablet (500 mg total) by mouth 2 (two) times daily with meals. (Patient not taking: Reported on  9/16/2024) 60 tablet 1    phenylephrine-DM-guaiFENesin (AQUANAZ) 10- mg Tab Take 1 tablet by mouth every 4 to 6 hours as needed (for congestion/cough). 30 tablet 0     No facility-administered encounter medications on file as of 9/16/2024.         Next scheduled follow-up appointment for ADD/ADHD management will be in 3 months.  If changes are made to medications, then will need to follow up in three to four weeks.    We discussed the management goals for patients with ADHD:  1. Adequate Control of Focus and/or Behavior.  2. Tolerable Medication Side-Effects (Including some Loss of Appetite).  Need to maintain weight.  3. Function well in life, school and/or work.

## 2024-09-16 NOTE — PATIENT INSTRUCTIONS
Dr. Santiago, [facility] [place]  [facility]  [phone]        PHARYNGITIS or SORE THROAT-STREP/VIRAL    What is pharyngitis:  - Sore throat  -Older children complains of sore throat  - Infants will have decreased appetite  - Throat may be red =/- pus  - Tonsillitis (inflammation of tonsils) is usually present with pharyngitis    Cause:  - Viruses cause 90% of pharyngitis  - Group A Strep bacteria causes 10%  - Only way to differentiate is to do a test in the office, i. e. rapid strep test    How long does it last?  - Viral sore throats usually last a few days  - Strep, after treatment, is not contagious after 24 hours, thus may return to school or   - May return to /school if no fever    Treatment:  Symptomatic treatments:  Gargle with salt water, if able (1/4 tsp salt per glass of water)- if older  Fever or pain control:  -- Acetaminophen (Tylenol) given every 4 hours   - Ibuprofen (Motrin, Advil) given every 6 hours (if > 6 months old)  - may alternate acetaminophen and ibuprofen every 3 hours  3.  Hydration, Rest  4.  Sucky candy (if older)    Symptomatic treatments for rhinitis symptoms, if present:   Medications can be used to relieve symptoms, but will NOT make the cold go away any faster  -  Dimetapp DM  -  Mucinex DM  - Polytussin-DM (Rx)  - Aquanaz (tablets)      Antibiotics if Strep:  Amoxil or Duricef or Keflex or Augmentin or ________    Scarlet Fever/Scarletina:  - Caused by rash producing form of strep throat  - On groin, armpits and elbow creases  - Rash like sandpaper, sunburn  - No worse than Step throat by itself  - rash clears in a few days  - sometimes, 1 - 2 weeks later skin peels, especially groin and fingertips    Reason we treat Strep throat:  - Strep, if untreated, can lead to Rheumatic Fever or Glomerulonephritis- serious illnesses    Contagious:  - If family members have symptoms of sore throat or fever, make an appointment to be checked for  strep throat    May return to school:  - Fever resolved for a day  - Symptoms controllable  - Feeling better    Call Immediately if:  - Your child develops excessive drooling  - Your child has great difficulty with swallowing    Call during regular office hours if:  - Fever lasts more than 2 days and has been treated with an antibiotic for strep  - Your child is getting worse  - You have any concerns or questions, please call the office- [phone].

## 2024-11-16 DIAGNOSIS — M54.50 LOW BACK PAIN, UNSPECIFIED BACK PAIN LATERALITY, UNSPECIFIED CHRONICITY, UNSPECIFIED WHETHER SCIATICA PRESENT: ICD-10-CM

## 2024-11-18 RX ORDER — NAPROXEN 500 MG/1
500 TABLET ORAL 2 TIMES DAILY WITH MEALS
Qty: 60 TABLET | Refills: 1 | Status: SHIPPED | OUTPATIENT
Start: 2024-11-18

## 2024-12-19 ENCOUNTER — OFFICE VISIT (OUTPATIENT)
Dept: PEDIATRICS | Facility: CLINIC | Age: 19
End: 2024-12-19
Payer: COMMERCIAL

## 2024-12-19 VITALS
WEIGHT: 190.81 LBS | TEMPERATURE: 98 F | BODY MASS INDEX: 25.29 KG/M2 | HEART RATE: 77 BPM | HEIGHT: 73 IN | SYSTOLIC BLOOD PRESSURE: 122 MMHG | DIASTOLIC BLOOD PRESSURE: 79 MMHG

## 2024-12-19 DIAGNOSIS — F90.2 ADHD (ATTENTION DEFICIT HYPERACTIVITY DISORDER), COMBINED TYPE: Primary | ICD-10-CM

## 2024-12-19 PROCEDURE — 99213 OFFICE O/P EST LOW 20 MIN: CPT | Mod: S$GLB,,, | Performed by: PEDIATRICS

## 2024-12-19 PROCEDURE — 3008F BODY MASS INDEX DOCD: CPT | Mod: CPTII,S$GLB,, | Performed by: PEDIATRICS

## 2024-12-19 PROCEDURE — 3074F SYST BP LT 130 MM HG: CPT | Mod: CPTII,S$GLB,, | Performed by: PEDIATRICS

## 2024-12-19 PROCEDURE — 3078F DIAST BP <80 MM HG: CPT | Mod: CPTII,S$GLB,, | Performed by: PEDIATRICS

## 2024-12-19 PROCEDURE — 1160F RVW MEDS BY RX/DR IN RCRD: CPT | Mod: CPTII,S$GLB,, | Performed by: PEDIATRICS

## 2024-12-19 PROCEDURE — 99999 PR PBB SHADOW E&M-EST. PATIENT-LVL III: CPT | Mod: PBBFAC,,, | Performed by: PEDIATRICS

## 2024-12-19 PROCEDURE — 1159F MED LIST DOCD IN RCRD: CPT | Mod: CPTII,S$GLB,, | Performed by: PEDIATRICS

## 2024-12-19 RX ORDER — DEXTROAMPHETAMINE SACCHARATE, AMPHETAMINE ASPARTATE MONOHYDRATE, DEXTROAMPHETAMINE SULFATE AND AMPHETAMINE SULFATE 5; 5; 5; 5 MG/1; MG/1; MG/1; MG/1
40 CAPSULE, EXTENDED RELEASE ORAL EVERY MORNING
Qty: 60 CAPSULE | Refills: 0 | Status: SHIPPED | OUTPATIENT
Start: 2024-12-19 | End: 2025-01-18

## 2024-12-19 RX ORDER — DEXTROAMPHETAMINE SACCHARATE, AMPHETAMINE ASPARTATE MONOHYDRATE, DEXTROAMPHETAMINE SULFATE AND AMPHETAMINE SULFATE 5; 5; 5; 5 MG/1; MG/1; MG/1; MG/1
40 CAPSULE, EXTENDED RELEASE ORAL EVERY MORNING
Qty: 60 CAPSULE | Refills: 0 | Status: SHIPPED | OUTPATIENT
Start: 2025-02-17 | End: 2025-03-19

## 2024-12-19 RX ORDER — DEXTROAMPHETAMINE SACCHARATE, AMPHETAMINE ASPARTATE MONOHYDRATE, DEXTROAMPHETAMINE SULFATE AND AMPHETAMINE SULFATE 5; 5; 5; 5 MG/1; MG/1; MG/1; MG/1
40 CAPSULE, EXTENDED RELEASE ORAL EVERY MORNING
Qty: 60 CAPSULE | Refills: 0 | Status: SHIPPED | OUTPATIENT
Start: 2025-01-18 | End: 2024-12-20 | Stop reason: SDUPTHER

## 2024-12-19 NOTE — PROGRESS NOTES
Subjective:   HPI:  Max Gray is a 19 y.o. patient here for follow up ADHD visit,  alone, who is a historian    his  ADHD symptoms have stayed the same since last visit.    Attentive in afternoon (with homework): Yes.    Side effects of Medicine:    Trouble Sleeping; Appetite Changes; Jitteriness; Irritability or moodiness; Headaches or Stomach Aches; Other? None.     Occupation:  at [employer]   Performance: Going well.    Accommodations? No    Current ADHD Medication/Dose in am: Adderall XR 20 mg twice daily in AM.    Afternoon medicine?   Dose- None.   Taking daily? Yes    Concerns? no      Last RHS:  7/30/24    Daria allergies, medications, history, and problem list were updated as appropriate.    Review of patient's allergies indicates:  No Known Allergies    Patient Active Problem List   Diagnosis    ADHD (attention deficit hyperactivity disorder), combined type    Back pain    Tachycardia         Review of Systems  A comprehensive review of symptoms was completed and negative except as noted in the HPI.      Objective:     There were no vitals filed for this visit.      Gen: The patient is alert and very calm.  Skin: No rashes are present.  Eyes: No redness or injection. No eye discharge.  ENT: Both tympanic membranes are clear, and the nasopharynx has no rhinorrhea. There is no redness in the throat.  Neck: No adenopathy is present.  Lungs: Clear without any wheezes or crackles.  Heart: Normal sinus rhythm without murmurs.  Abdomen: Soft -- no masses or distention. There is no hepatomegaly or splenomegaly.        Assessment/Plan:        ADHD (attention deficit hyperactivity disorder), combined type            Outpatient Encounter Medications as of 12/19/2024   Medication Sig Dispense Refill    dextroamphetamine-amphetamine (ADDERALL XR) 20 MG 24 hr capsule Take 2 capsules (40 mg total) by mouth every morning. 60 capsule 0    naproxen (NAPROSYN) 500 MG tablet Take 1 tablet (500  mg total) by mouth 2 (two) times daily with meals. (Patient not taking: Reported on 7/30/2024) 60 tablet 1    naproxen (NAPROSYN) 500 MG tablet Take 1 tablet (500 mg total) by mouth 2 (two) times daily with meals. 60 tablet 1    phenylephrine-DM-guaiFENesin (AQUANAZ) 10- mg Tab Take 1 tablet by mouth every 4 to 6 hours as needed (for congestion/cough). 30 tablet 0     No facility-administered encounter medications on file as of 12/19/2024.         Next scheduled follow-up appointment for ADD/ADHD management will be in 3 months.  If changes are made to medications, then will need to follow up in three to four weeks.    We discussed the management goals for patients with ADHD:  1. Adequate Control of Focus and/or Behavior.  2. Tolerable Medication Side-Effects (Including some Loss of Appetite).  Need to maintain weight.  3. Function well in life, school and/or work.

## 2024-12-20 DIAGNOSIS — F90.2 ADHD (ATTENTION DEFICIT HYPERACTIVITY DISORDER), COMBINED TYPE: ICD-10-CM

## 2024-12-20 RX ORDER — DEXTROAMPHETAMINE SACCHARATE, AMPHETAMINE ASPARTATE MONOHYDRATE, DEXTROAMPHETAMINE SULFATE AND AMPHETAMINE SULFATE 5; 5; 5; 5 MG/1; MG/1; MG/1; MG/1
40 CAPSULE, EXTENDED RELEASE ORAL EVERY MORNING
Qty: 60 CAPSULE | Refills: 0 | Status: SHIPPED | OUTPATIENT
Start: 2024-12-20 | End: 2025-01-19

## 2024-12-20 RX ORDER — DEXTROAMPHETAMINE SACCHARATE, AMPHETAMINE ASPARTATE MONOHYDRATE, DEXTROAMPHETAMINE SULFATE AND AMPHETAMINE SULFATE 5; 5; 5; 5 MG/1; MG/1; MG/1; MG/1
40 CAPSULE, EXTENDED RELEASE ORAL EVERY MORNING
Qty: 60 CAPSULE | Refills: 0 | Status: SHIPPED | OUTPATIENT
Start: 2025-01-18 | End: 2025-02-17

## 2024-12-20 NOTE — TELEPHONE ENCOUNTER
Rx had the incorrect date----- Message from Med Assistant Elizabeth sent at 12/20/2024  3:00 PM CST -----  dextroamphetamine-amphetamine (ADDERALL XR) 20 MG 24 hr capsule   [id number] - [place], [place] - [id number]  Wanted this filled but the fill date was sent as January 20th and he wanted to get the meds today  [phone]

## 2024-12-20 NOTE — TELEPHONE ENCOUNTER
Pharmacy change:Addedrall XR 20 mg PO daily ----- Message from Med Assistant Elizabeth sent at 12/20/2024  9:51 AM CST -----  dextroamphetamine-amphetamine (ADDERALL XR) 20 MG 24 hr capsule  Wants meds sent to:  [id number] - [place] - [id number]

## 2025-01-19 DIAGNOSIS — F90.2 ADHD (ATTENTION DEFICIT HYPERACTIVITY DISORDER), COMBINED TYPE: ICD-10-CM

## 2025-01-19 DIAGNOSIS — M54.50 LOW BACK PAIN, UNSPECIFIED BACK PAIN LATERALITY, UNSPECIFIED CHRONICITY, UNSPECIFIED WHETHER SCIATICA PRESENT: ICD-10-CM

## 2025-01-25 RX ORDER — DEXTROAMPHETAMINE SACCHARATE, AMPHETAMINE ASPARTATE MONOHYDRATE, DEXTROAMPHETAMINE SULFATE AND AMPHETAMINE SULFATE 5; 5; 5; 5 MG/1; MG/1; MG/1; MG/1
40 CAPSULE, EXTENDED RELEASE ORAL EVERY MORNING
Qty: 60 CAPSULE | Refills: 0 | Status: SHIPPED | OUTPATIENT
Start: 2025-01-25 | End: 2025-02-24

## 2025-01-25 RX ORDER — NAPROXEN 500 MG/1
500 TABLET ORAL 2 TIMES DAILY WITH MEALS
Qty: 60 TABLET | Refills: 1 | Status: SHIPPED | OUTPATIENT
Start: 2025-01-25

## 2025-02-20 DIAGNOSIS — F90.2 ADHD (ATTENTION DEFICIT HYPERACTIVITY DISORDER), COMBINED TYPE: ICD-10-CM

## 2025-02-20 DIAGNOSIS — M54.50 LOW BACK PAIN, UNSPECIFIED BACK PAIN LATERALITY, UNSPECIFIED CHRONICITY, UNSPECIFIED WHETHER SCIATICA PRESENT: ICD-10-CM

## 2025-02-20 RX ORDER — DEXTROAMPHETAMINE SACCHARATE, AMPHETAMINE ASPARTATE MONOHYDRATE, DEXTROAMPHETAMINE SULFATE AND AMPHETAMINE SULFATE 5; 5; 5; 5 MG/1; MG/1; MG/1; MG/1
40 CAPSULE, EXTENDED RELEASE ORAL EVERY MORNING
Qty: 60 CAPSULE | Refills: 0 | Status: SHIPPED | OUTPATIENT
Start: 2025-02-20 | End: 2025-03-22

## 2025-02-20 RX ORDER — NAPROXEN 500 MG/1
500 TABLET ORAL 2 TIMES DAILY WITH MEALS
Qty: 60 TABLET | Refills: 1 | Status: SHIPPED | OUTPATIENT
Start: 2025-02-20